# Patient Record
Sex: FEMALE | Race: BLACK OR AFRICAN AMERICAN | Employment: FULL TIME | URBAN - METROPOLITAN AREA
[De-identification: names, ages, dates, MRNs, and addresses within clinical notes are randomized per-mention and may not be internally consistent; named-entity substitution may affect disease eponyms.]

---

## 2021-05-18 ENCOUNTER — HOSPITAL ENCOUNTER (INPATIENT)
Age: 61
LOS: 4 days | Discharge: NURSING, REHAB OR LONG-TERM CARE | DRG: 502 | End: 2021-05-24
Attending: EMERGENCY MEDICINE | Admitting: INTERNAL MEDICINE
Payer: COMMERCIAL

## 2021-05-18 ENCOUNTER — APPOINTMENT (OUTPATIENT)
Dept: CT IMAGING | Age: 61
DRG: 502 | End: 2021-05-18
Payer: COMMERCIAL

## 2021-05-18 DIAGNOSIS — Z98.890 S/P ARTHROCENTESIS: ICD-10-CM

## 2021-05-18 DIAGNOSIS — N28.1 RENAL CYST: ICD-10-CM

## 2021-05-18 DIAGNOSIS — R52 UNCONTROLLED PAIN: ICD-10-CM

## 2021-05-18 DIAGNOSIS — M54.9 INTRACTABLE BACK PAIN: Primary | ICD-10-CM

## 2021-05-18 LAB
ALBUMIN SERPL-MCNC: 4 G/DL (ref 3.5–5.2)
ALP BLD-CCNC: 55 U/L (ref 35–104)
ALT SERPL-CCNC: 39 U/L (ref 0–32)
AMORPHOUS: ABNORMAL
ANION GAP SERPL CALCULATED.3IONS-SCNC: 11 MMOL/L (ref 7–16)
AST SERPL-CCNC: 53 U/L (ref 0–31)
BACTERIA: ABNORMAL /HPF
BASOPHILS ABSOLUTE: 0.01 E9/L (ref 0–0.2)
BASOPHILS RELATIVE PERCENT: 0.2 % (ref 0–2)
BILIRUB SERPL-MCNC: 1.3 MG/DL (ref 0–1.2)
BILIRUBIN URINE: NEGATIVE
BLOOD, URINE: NEGATIVE
BUN BLDV-MCNC: 12 MG/DL (ref 6–23)
CALCIUM SERPL-MCNC: 9.5 MG/DL (ref 8.6–10.2)
CHLORIDE BLD-SCNC: 102 MMOL/L (ref 98–107)
CLARITY: ABNORMAL
CO2: 22 MMOL/L (ref 22–29)
COLOR: ABNORMAL
CREAT SERPL-MCNC: 0.7 MG/DL (ref 0.5–1)
EOSINOPHILS ABSOLUTE: 0 E9/L (ref 0.05–0.5)
EOSINOPHILS RELATIVE PERCENT: 0 % (ref 0–6)
GFR AFRICAN AMERICAN: >60
GFR NON-AFRICAN AMERICAN: >60 ML/MIN/1.73
GLUCOSE BLD-MCNC: 141 MG/DL (ref 74–99)
GLUCOSE URINE: NEGATIVE MG/DL
HCT VFR BLD CALC: 35.2 % (ref 34–48)
HEMOGLOBIN: 11.6 G/DL (ref 11.5–15.5)
IMMATURE GRANULOCYTES #: 0.03 E9/L
IMMATURE GRANULOCYTES %: 0.5 % (ref 0–5)
KETONES, URINE: NEGATIVE MG/DL
LACTIC ACID: 1.2 MMOL/L (ref 0.5–2.2)
LEUKOCYTE ESTERASE, URINE: NEGATIVE
LIPASE: 13 U/L (ref 13–60)
LYMPHOCYTES ABSOLUTE: 0.2 E9/L (ref 1.5–4)
LYMPHOCYTES RELATIVE PERCENT: 3.5 % (ref 20–42)
MCH RBC QN AUTO: 33.3 PG (ref 26–35)
MCHC RBC AUTO-ENTMCNC: 33 % (ref 32–34.5)
MCV RBC AUTO: 101.1 FL (ref 80–99.9)
MONOCYTES ABSOLUTE: 0.15 E9/L (ref 0.1–0.95)
MONOCYTES RELATIVE PERCENT: 2.6 % (ref 2–12)
NEUTROPHILS ABSOLUTE: 5.35 E9/L (ref 1.8–7.3)
NEUTROPHILS RELATIVE PERCENT: 93.2 % (ref 43–80)
NITRITE, URINE: NEGATIVE
OVALOCYTES: ABNORMAL
PDW BLD-RTO: 14.8 FL (ref 11.5–15)
PH UA: 5.5 (ref 5–9)
PLATELET # BLD: 184 E9/L (ref 130–450)
PMV BLD AUTO: 10.8 FL (ref 7–12)
POIKILOCYTES: ABNORMAL
POLYCHROMASIA: ABNORMAL
POTASSIUM SERPL-SCNC: 3.4 MMOL/L (ref 3.5–5)
PROTEIN UA: 30 MG/DL
RBC # BLD: 3.48 E12/L (ref 3.5–5.5)
RBC UA: ABNORMAL /HPF (ref 0–2)
SODIUM BLD-SCNC: 135 MMOL/L (ref 132–146)
SPECIFIC GRAVITY UA: 1.02 (ref 1–1.03)
TOTAL PROTEIN: 7.7 G/DL (ref 6.4–8.3)
TROPONIN: <0.01 NG/ML (ref 0–0.03)
UROBILINOGEN, URINE: 0.2 E.U./DL
WBC # BLD: 5.7 E9/L (ref 4.5–11.5)
WBC UA: ABNORMAL /HPF (ref 0–5)

## 2021-05-18 PROCEDURE — 83605 ASSAY OF LACTIC ACID: CPT

## 2021-05-18 PROCEDURE — 96374 THER/PROPH/DIAG INJ IV PUSH: CPT

## 2021-05-18 PROCEDURE — 99220 PR INITIAL OBSERVATION CARE/DAY 70 MINUTES: CPT | Performed by: INTERNAL MEDICINE

## 2021-05-18 PROCEDURE — 84165 PROTEIN E-PHORESIS SERUM: CPT

## 2021-05-18 PROCEDURE — 83690 ASSAY OF LIPASE: CPT

## 2021-05-18 PROCEDURE — 2580000003 HC RX 258: Performed by: INTERNAL MEDICINE

## 2021-05-18 PROCEDURE — 74176 CT ABD & PELVIS W/O CONTRAST: CPT

## 2021-05-18 PROCEDURE — 96376 TX/PRO/DX INJ SAME DRUG ADON: CPT

## 2021-05-18 PROCEDURE — 86334 IMMUNOFIX E-PHORESIS SERUM: CPT

## 2021-05-18 PROCEDURE — 87040 BLOOD CULTURE FOR BACTERIA: CPT

## 2021-05-18 PROCEDURE — 81001 URINALYSIS AUTO W/SCOPE: CPT

## 2021-05-18 PROCEDURE — 6360000002 HC RX W HCPCS: Performed by: NURSE PRACTITIONER

## 2021-05-18 PROCEDURE — 0202U NFCT DS 22 TRGT SARS-COV-2: CPT

## 2021-05-18 PROCEDURE — 82784 ASSAY IGA/IGD/IGG/IGM EACH: CPT

## 2021-05-18 PROCEDURE — G0378 HOSPITAL OBSERVATION PER HR: HCPCS

## 2021-05-18 PROCEDURE — 96375 TX/PRO/DX INJ NEW DRUG ADDON: CPT

## 2021-05-18 PROCEDURE — 85025 COMPLETE CBC W/AUTO DIFF WBC: CPT

## 2021-05-18 PROCEDURE — 84145 PROCALCITONIN (PCT): CPT

## 2021-05-18 PROCEDURE — 2580000003 HC RX 258: Performed by: NURSE PRACTITIONER

## 2021-05-18 PROCEDURE — 84484 ASSAY OF TROPONIN QUANT: CPT

## 2021-05-18 PROCEDURE — 96361 HYDRATE IV INFUSION ADD-ON: CPT

## 2021-05-18 PROCEDURE — 6370000000 HC RX 637 (ALT 250 FOR IP): Performed by: NURSE PRACTITIONER

## 2021-05-18 PROCEDURE — 87150 DNA/RNA AMPLIFIED PROBE: CPT

## 2021-05-18 PROCEDURE — 87077 CULTURE AEROBIC IDENTIFY: CPT

## 2021-05-18 PROCEDURE — 87449 NOS EACH ORGANISM AG IA: CPT

## 2021-05-18 PROCEDURE — 99285 EMERGENCY DEPT VISIT HI MDM: CPT

## 2021-05-18 PROCEDURE — 36415 COLL VENOUS BLD VENIPUNCTURE: CPT

## 2021-05-18 PROCEDURE — 6360000002 HC RX W HCPCS: Performed by: INTERNAL MEDICINE

## 2021-05-18 PROCEDURE — 87186 SC STD MICRODIL/AGAR DIL: CPT

## 2021-05-18 PROCEDURE — 80053 COMPREHEN METABOLIC PANEL: CPT

## 2021-05-18 PROCEDURE — 87088 URINE BACTERIA CULTURE: CPT

## 2021-05-18 RX ORDER — ALPRAZOLAM 0.25 MG/1
0.25 TABLET ORAL 3 TIMES DAILY PRN
COMMUNITY

## 2021-05-18 RX ORDER — PANTOPRAZOLE SODIUM 40 MG/1
40 TABLET, DELAYED RELEASE ORAL
Status: DISCONTINUED | OUTPATIENT
Start: 2021-05-19 | End: 2021-05-24 | Stop reason: HOSPADM

## 2021-05-18 RX ORDER — ACETAMINOPHEN 650 MG/1
650 SUPPOSITORY RECTAL EVERY 6 HOURS PRN
Status: DISCONTINUED | OUTPATIENT
Start: 2021-05-18 | End: 2021-05-24 | Stop reason: HOSPADM

## 2021-05-18 RX ORDER — ACETAMINOPHEN 500 MG
1000 TABLET ORAL ONCE
Status: COMPLETED | OUTPATIENT
Start: 2021-05-18 | End: 2021-05-18

## 2021-05-18 RX ORDER — OMEPRAZOLE 10 MG/1
20 CAPSULE, DELAYED RELEASE ORAL EVERY OTHER DAY
COMMUNITY

## 2021-05-18 RX ORDER — 0.9 % SODIUM CHLORIDE 0.9 %
1000 INTRAVENOUS SOLUTION INTRAVENOUS ONCE
Status: COMPLETED | OUTPATIENT
Start: 2021-05-18 | End: 2021-05-18

## 2021-05-18 RX ORDER — POLYETHYLENE GLYCOL 3350 17 G/17G
17 POWDER, FOR SOLUTION ORAL DAILY PRN
Status: DISCONTINUED | OUTPATIENT
Start: 2021-05-18 | End: 2021-05-24 | Stop reason: HOSPADM

## 2021-05-18 RX ORDER — MORPHINE SULFATE 4 MG/ML
4 INJECTION, SOLUTION INTRAMUSCULAR; INTRAVENOUS ONCE
Status: COMPLETED | OUTPATIENT
Start: 2021-05-18 | End: 2021-05-18

## 2021-05-18 RX ORDER — LIDOCAINE 4 G/G
1 PATCH TOPICAL DAILY
Status: DISCONTINUED | OUTPATIENT
Start: 2021-05-19 | End: 2021-05-24 | Stop reason: HOSPADM

## 2021-05-18 RX ORDER — FLUTICASONE PROPIONATE 50 MCG
1 SPRAY, SUSPENSION (ML) NASAL DAILY PRN
COMMUNITY

## 2021-05-18 RX ORDER — ONDANSETRON 2 MG/ML
4 INJECTION INTRAMUSCULAR; INTRAVENOUS ONCE
Status: COMPLETED | OUTPATIENT
Start: 2021-05-18 | End: 2021-05-18

## 2021-05-18 RX ORDER — IBUPROFEN 400 MG/1
400 TABLET ORAL EVERY 6 HOURS PRN
Status: DISCONTINUED | OUTPATIENT
Start: 2021-05-18 | End: 2021-05-24 | Stop reason: HOSPADM

## 2021-05-18 RX ORDER — ACETAMINOPHEN 325 MG/1
650 TABLET ORAL EVERY 6 HOURS PRN
Status: DISCONTINUED | OUTPATIENT
Start: 2021-05-18 | End: 2021-05-24 | Stop reason: HOSPADM

## 2021-05-18 RX ORDER — SODIUM CHLORIDE 9 MG/ML
25 INJECTION, SOLUTION INTRAVENOUS PRN
Status: DISCONTINUED | OUTPATIENT
Start: 2021-05-18 | End: 2021-05-21 | Stop reason: SDUPTHER

## 2021-05-18 RX ORDER — ALBUTEROL SULFATE 90 UG/1
2 AEROSOL, METERED RESPIRATORY (INHALATION) EVERY 6 HOURS PRN
COMMUNITY

## 2021-05-18 RX ORDER — SODIUM CHLORIDE 0.9 % (FLUSH) 0.9 %
10 SYRINGE (ML) INJECTION EVERY 12 HOURS SCHEDULED
Status: DISCONTINUED | OUTPATIENT
Start: 2021-05-18 | End: 2021-05-21 | Stop reason: SDUPTHER

## 2021-05-18 RX ORDER — OXYCODONE AND ACETAMINOPHEN 10; 325 MG/1; MG/1
1 TABLET ORAL EVERY 4 HOURS PRN
Status: DISCONTINUED | OUTPATIENT
Start: 2021-05-18 | End: 2021-05-24 | Stop reason: HOSPADM

## 2021-05-18 RX ORDER — MORPHINE SULFATE 2 MG/ML
2 INJECTION, SOLUTION INTRAMUSCULAR; INTRAVENOUS EVERY 4 HOURS PRN
Status: DISCONTINUED | OUTPATIENT
Start: 2021-05-18 | End: 2021-05-21 | Stop reason: DRUGHIGH

## 2021-05-18 RX ORDER — FLUTICASONE FUROATE AND VILANTEROL TRIFENATATE 100; 25 UG/1; UG/1
1 POWDER RESPIRATORY (INHALATION) DAILY
COMMUNITY

## 2021-05-18 RX ORDER — OXYCODONE HYDROCHLORIDE AND ACETAMINOPHEN 5; 325 MG/1; MG/1
1 TABLET ORAL EVERY 4 HOURS PRN
Status: DISCONTINUED | OUTPATIENT
Start: 2021-05-18 | End: 2021-05-24 | Stop reason: HOSPADM

## 2021-05-18 RX ORDER — ALBUTEROL SULFATE 2.5 MG/3ML
2.5 SOLUTION RESPIRATORY (INHALATION) EVERY 6 HOURS PRN
Status: DISCONTINUED | OUTPATIENT
Start: 2021-05-18 | End: 2021-05-24 | Stop reason: HOSPADM

## 2021-05-18 RX ORDER — LORATADINE 10 MG/1
20 CAPSULE, LIQUID FILLED ORAL DAILY
COMMUNITY

## 2021-05-18 RX ORDER — BUDESONIDE, GLYCOPYRROLATE, AND FORMOTEROL FUMARATE 160; 9; 4.8 UG/1; UG/1; UG/1
AEROSOL, METERED RESPIRATORY (INHALATION)
COMMUNITY
End: 2021-05-18

## 2021-05-18 RX ORDER — OXYCODONE HYDROCHLORIDE AND ACETAMINOPHEN 5; 325 MG/1; MG/1
1 TABLET ORAL ONCE
Status: COMPLETED | OUTPATIENT
Start: 2021-05-18 | End: 2021-05-18

## 2021-05-18 RX ORDER — SODIUM CHLORIDE 0.9 % (FLUSH) 0.9 %
10 SYRINGE (ML) INJECTION PRN
Status: DISCONTINUED | OUTPATIENT
Start: 2021-05-18 | End: 2021-05-21 | Stop reason: SDUPTHER

## 2021-05-18 RX ADMIN — MORPHINE SULFATE 4 MG: 4 INJECTION, SOLUTION INTRAMUSCULAR; INTRAVENOUS at 17:02

## 2021-05-18 RX ADMIN — OXYCODONE HYDROCHLORIDE AND ACETAMINOPHEN 1 TABLET: 5; 325 TABLET ORAL at 19:57

## 2021-05-18 RX ADMIN — ACETAMINOPHEN 1000 MG: 500 TABLET ORAL at 17:03

## 2021-05-18 RX ADMIN — Medication 10 ML: at 22:03

## 2021-05-18 RX ADMIN — ONDANSETRON 4 MG: 2 INJECTION INTRAMUSCULAR; INTRAVENOUS at 17:01

## 2021-05-18 RX ADMIN — SODIUM CHLORIDE 1000 ML: 9 INJECTION, SOLUTION INTRAVENOUS at 17:01

## 2021-05-18 RX ADMIN — MORPHINE SULFATE 2 MG: 2 INJECTION, SOLUTION INTRAMUSCULAR; INTRAVENOUS at 22:13

## 2021-05-18 ASSESSMENT — PAIN SCALES - GENERAL
PAINLEVEL_OUTOF10: 9
PAINLEVEL_OUTOF10: 10
PAINLEVEL_OUTOF10: 9
PAINLEVEL_OUTOF10: 8
PAINLEVEL_OUTOF10: 9

## 2021-05-18 ASSESSMENT — PAIN DESCRIPTION - PROGRESSION: CLINICAL_PROGRESSION: GRADUALLY WORSENING

## 2021-05-18 ASSESSMENT — PAIN DESCRIPTION - FREQUENCY: FREQUENCY: CONTINUOUS

## 2021-05-18 ASSESSMENT — PAIN DESCRIPTION - ONSET: ONSET: ON-GOING

## 2021-05-18 ASSESSMENT — PAIN DESCRIPTION - LOCATION: LOCATION: BACK

## 2021-05-18 ASSESSMENT — PAIN DESCRIPTION - ORIENTATION: ORIENTATION: RIGHT;LOWER

## 2021-05-18 ASSESSMENT — PAIN DESCRIPTION - PAIN TYPE: TYPE: ACUTE PAIN

## 2021-05-18 ASSESSMENT — PAIN DESCRIPTION - DESCRIPTORS: DESCRIPTORS: ACHING;CONSTANT;SHARP

## 2021-05-18 NOTE — ED NOTES
Bed: 09  Expected date:   Expected time:   Means of arrival:   Comments:  JIHAN Urban RN  05/18/21 6520

## 2021-05-18 NOTE — Clinical Note
Patient Class: Observation [104]   REQUIRED: Diagnosis: Uncontrolled pain [047767]   Estimated Length of Stay: Estimated stay of less than 2 midnights   Admitting Provider: Myron James [[id number]]   Telemetry/Cardiac Monitoring Required?: No

## 2021-05-18 NOTE — ED PROVIDER NOTES
ED Attending  [facility]   ED  Encounter Note  Admit Date/RoomTime: 2021  2:59 PM  ED Room:     NAME: Chhaya Hyde  : 1960  MRN: [id number]     Chief Complaint:  Flank Pain (Right sided- started last night- hx of kidney stone)    History of Present Illness        Chhaya Hyde is a 63 y.o. old female who presents to the emergency department by private vehicle for sudden onset aching, sharp, shooting, stabbing pain in the right flank without radiation which began a few day(s) prior to arrival.   There has been similar episodes in the past , hx of kidney stones. Since onset the symptoms have been remaining constant. The pain is associated with nothing pertinent. The pain is aggravated by nothing in particular and relieved by nothing. There has been NO abdominal pain, cloudy urine, constipation, diarrhea, dysuria, headache, hematuria, sweating, urinary frequency, urinary incontinence, urinary urgency, vaginal discharge, vaginal itching or vomiting. She denies any shortness breath, chest pain, lightness, dizziness, saddle anesthesia, nausea bowel bladder incontinence, numbness, tingling, weakness. .  ROS   Pertinent positives and negatives are stated within HPI, all other systems reviewed and are negative. Past Medical History:  has a past medical history of Asthma, Cancer ([address] and Kidney stones. Surgical History has a past surgical history that includes Breast surgery. Social History:  reports that she quit smoking about 44 years ago. Her smoking use included cigarettes. She started smoking about 46 years ago. She smoked 0.25 packs per day. She has never used smokeless tobacco. She reports previous alcohol use. She reports that she does not use drugs. Family History: family history is not on file.      Allergies: Adhesive tape, Clarithromycin, and Codeine    Physical Exam   Oxygen Saturation Interpretation: Normal.        ED Triage Vitals [05/18/21 1503]   BP Temp Temp Source Pulse Resp SpO2 Height Weight   115/70 100.4 °F (38 °C) Oral 83 16 95 % 5' 6\" (1.676 m) 160 lb (72.6 kg)         General Appearance/Constitutional:  Alert, development consistent with age  [de-identified]:  NC/NT. PERRLA. Airway patent. Neck:  Supple. No lymphadenopathy. Respiratory:  No retractions. Lungs Clear to auscultation and breath sounds equal.  CV:  Regular rate and rhythm. GI:  normal appearing, non-distended with no visible hernias. Bowel sounds: normal bowel sounds. Tenderness: There is mild tenderness present - located in the right flank., There is no rebound tenderness. , There is no guarding. , There is no distension. , There is no pulsatile mass. .           Liver: non-tender. Spleen:  non-tender. Back: CVA Tenderness: Yes, Right. Integument:  Normal turgor. Warm, dry, without visible rash, unless noted elsewhere. Lymphatics: No edema, cap.refill <3sec. Neurological:  Orientation age-appropriate. Motor functions intact.     Lab / Imaging Results   (All laboratory and radiology results have been personally reviewed by myself)  Labs:  Results for orders placed or performed during the hospital encounter of 05/18/21   CBC Auto Differential   Result Value Ref Range    WBC 5.7 4.5 - 11.5 E9/L    RBC 3.48 (L) 3.50 - 5.50 E12/L    Hemoglobin 11.6 11.5 - 15.5 g/dL    Hematocrit 35.2 34.0 - 48.0 %    .1 (H) 80.0 - 99.9 fL    MCH 33.3 26.0 - 35.0 pg    MCHC 33.0 32.0 - 34.5 %    RDW 14.8 11.5 - 15.0 fL    Platelets 891 045 - 083 E9/L    MPV 10.8 7.0 - 12.0 fL    Neutrophils % 93.2 (H) 43.0 - 80.0 %    Immature Granulocytes % 0.5 0.0 - 5.0 %    Lymphocytes % 3.5 (L) 20.0 - 42.0 %    Monocytes % 2.6 2.0 - 12.0 %    Eosinophils % 0.0 0.0 - 6.0 %    Basophils % 0.2 0.0 - 2.0 %    Neutrophils Absolute 5.35 1.80 - 7.30 E9/L    Immature Granulocytes # 0.03 E9/L    Lymphocytes Absolute 0.20 (L) 1.50 - 4.00 E9/L    Monocytes Absolute 0.15 0.10 - 0.95 E9/L    Eosinophils Absolute 0.00 (L) 0.05 - 0.50 E9/L    Basophils Absolute 0.01 0.00 - 0.20 E9/L    Polychromasia 1+     Poikilocytes 1+     Ovalocytes 1+    Comprehensive Metabolic Panel   Result Value Ref Range    Sodium 135 132 - 146 mmol/L    Potassium 3.4 (L) 3.5 - 5.0 mmol/L    Chloride 102 98 - 107 mmol/L    CO2 22 22 - 29 mmol/L    Anion Gap 11 7 - 16 mmol/L    Glucose 141 (H) 74 - 99 mg/dL    BUN 12 6 - 23 mg/dL    CREATININE 0.7 0.5 - 1.0 mg/dL    GFR Non-African American >60 >=60 mL/min/1.73    GFR African American >60     Calcium 9.5 8.6 - 10.2 mg/dL    Total Protein 7.7 6.4 - 8.3 g/dL    Albumin 4.0 3.5 - 5.2 g/dL    Total Bilirubin 1.3 (H) 0.0 - 1.2 mg/dL    Alkaline Phosphatase 55 35 - 104 U/L    ALT 39 (H) 0 - 32 U/L    AST 53 (H) 0 - 31 U/L   Lipase   Result Value Ref Range    Lipase 13 13 - 60 U/L   Troponin   Result Value Ref Range    Troponin <0.01 0.00 - 0.03 ng/mL   Urinalysis   Result Value Ref Range    Color, UA DARK YELLOW (A) Straw/Yellow    Clarity, UA SL CLOUDY Clear    Glucose, Ur Negative Negative mg/dL    Bilirubin Urine Negative Negative    Ketones, Urine Negative Negative mg/dL    Specific Gravity, UA 1.025 1.005 - 1.030    Blood, Urine Negative Negative    pH, UA 5.5 5.0 - 9.0    Protein, UA 30 (A) Negative mg/dL    Urobilinogen, Urine 0.2 <2.0 E.U./dL    Nitrite, Urine Negative Negative    Leukocyte Esterase, Urine Negative Negative   Lactic Acid, Plasma   Result Value Ref Range    Lactic Acid 1.2 0.5 - 2.2 mmol/L   Microscopic Urinalysis   Result Value Ref Range    WBC, UA 0-1 0 - 5 /HPF    RBC, UA 0-1 0 - 2 /HPF    Bacteria, UA MODERATE (A) None Seen /HPF    Amorphous, UA FEW      Imaging: All Radiology results interpreted by Radiologist unless otherwise noted. CT ABDOMEN PELVIS WO CONTRAST Additional Contrast? None   Final Result   1. No acute abnormality is seen in the abdomen or the pelvis.    2. No urolithiasis or hydronephrosis   3. 1.5 probable complex cyst in the right kidney. Sonographic evaluation   recommended. 4. Subtle lucencies in the pelvic bones are nonspecific and of questionable   significance. Possibility metastatic disease or multiple myeloma should be   considered. Clinical correlation is needed. ED Course / Medical Decision Making     Medications   0.9 % sodium chloride bolus (0 mLs Intravenous Stopped 5/18/21 1824)   ondansetron (ZOFRAN) injection 4 mg (4 mg Intravenous Given 5/18/21 1701)   morphine sulfate (PF) injection 4 mg (4 mg Intravenous Given 5/18/21 1702)   acetaminophen (TYLENOL) tablet 1,000 mg (1,000 mg Oral Given 5/18/21 1703)   oxyCODONE-acetaminophen (PERCOCET) 5-325 MG per tablet 1 tablet (1 tablet Oral Given 5/18/21 1957)        Re-Evaluations:  5/18/21      Time: 1805-reviewed all results with patient. Patient states she is unable to ambulate due to pain. Patient states all her doctors are in [place]. She states she has been in remission from breast cancer for the last 6 months. Currently not on any treatment at this time. 1923-patient states she is unable to ambulate due to pain. She states she has no local follow-up and requesting to be admitted. 2059- RN attempted to ambulate patient. Patient states she is unable to stand due to pain. Patients condition remains unchanged. Consultations:  [address] [place] [address] with Dr. Sheela Dnaielle, hospitalist, he requests to give patient a Percocet and wait to see if patient can ambulate, if she is unable to he will admit for pain control. 2059- Spoke with Dr. Sheela Danielle, he agrees and accepts admission to the hospital for uncontrolled pain. Procedures:   none    MDM:  Patient presents to the ED for right lower back/flank pain. Differential diagnoses included but not limited to kidney stone versus UTI versus muscular strain. Workup in the ED revealed urinalysis was unremarkable.   Blood cultures were pending at this time due to patient having a 100.4 fever. Lactic acid was 1.2. CBC revealed a normal white count of 5.7. CMP revealed slightly elevated liver enzymes. Lipase was normal at 13. Troponin was less than 0.01. Patient denies any short of breath or chest pain. CT of the abdomen pelvis reveals no acute abnormality seen in the abdomen pelvis. No urolithiasis or hydronephrosis. Probable complex cyst in the right kidney. Subtle lucencies in the pelvic bone. Patient has a history of breast cancer which she states [place] the [place] the [place] 6 [place] and follows with an oncologist and went to [facility] where she resides. She states she is unable to return home due to pain and unable to walk. She states she is been laying on her family members couch and unable to get up. Patient was given IV fluids, morphine and Percocet with minimal pain relief. She states pain has reduced when laying still. Patient refuses and states she is unable to ambulate due to pain. Patient requires continued workup and management of their symptoms and will be admitted to the hospital for further evaluation and treatment. Plan of Care/Counseling:  I reviewed today's visit with the patient in addition to providing specific details for the plan of care and counseling regarding the diagnosis and prognosis. Questions are answered at this time and are agreeable with the plan. Assessment      1. Intractable back pain    2. Renal cyst      This patient's ED course included: a personal history and physicial examination, re-evaluation prior to disposition, multiple bedside re-evaluations and IV medications  This patient has remained hemodynamically stable during their ED course. Plan   Admission to General Medical Floor  Patient condition is stable.     New Medications     New Prescriptions    No medications on file     Electronically signed by JESSA Arizmendi CNP   DD: 5/18/21  **This report was transcribed using voice recognition software. Every effort was made to ensure accuracy; however, inadvertent computerized transcription errors may be present.   END OF PROVIDER NOTE        Martin Urban, JESSA - CNP  05/18/21 8719

## 2021-05-19 ENCOUNTER — APPOINTMENT (OUTPATIENT)
Dept: CT IMAGING | Age: 61
DRG: 502 | End: 2021-05-19
Payer: COMMERCIAL

## 2021-05-19 LAB
ACINETOBACTER BAUMANNII BY PCR: NOT DETECTED
ADENOVIRUS BY PCR: NOT DETECTED
BORDETELLA PARAPERTUSSIS BY PCR: NOT DETECTED
BORDETELLA PERTUSSIS BY PCR: NOT DETECTED
BOTTLE TYPE: ABNORMAL
C-REACTIVE PROTEIN: 27.5 MG/DL (ref 0–0.4)
CANDIDA ALBICANS BY PCR: NOT DETECTED
CANDIDA GLABRATA BY PCR: NOT DETECTED
CANDIDA KRUSEI BY PCR: NOT DETECTED
CANDIDA PARAPSILOSIS BY PCR: NOT DETECTED
CANDIDA TROPICALIS BY PCR: NOT DETECTED
CHLAMYDOPHILIA PNEUMONIAE BY PCR: NOT DETECTED
CORONAVIRUS 229E BY PCR: NOT DETECTED
CORONAVIRUS HKU1 BY PCR: NOT DETECTED
CORONAVIRUS NL63 BY PCR: NOT DETECTED
CORONAVIRUS OC43 BY PCR: NOT DETECTED
ENTEROBACTER CLOACAE COMPLEX BY PCR: NOT DETECTED
ENTEROBACTERALES BY PCR: NOT DETECTED
ENTEROCOCCUS BY PCR: NOT DETECTED
ESCHERICHIA COLI BY PCR: NOT DETECTED
HAEMOPHILUS INFLUENZAE BY PCR: NOT DETECTED
HUMAN METAPNEUMOVIRUS BY PCR: NOT DETECTED
HUMAN RHINOVIRUS/ENTEROVIRUS BY PCR: DETECTED
INFLUENZA A BY PCR: NOT DETECTED
INFLUENZA B BY PCR: NOT DETECTED
KLEBSIELLA OXYTOCA BY PCR: NOT DETECTED
KLEBSIELLA PNEUMONIAE GROUP BY PCR: NOT DETECTED
LISTERIA MONOCYTOGENES BY PCR: NOT DETECTED
MYCOPLASMA PNEUMONIAE BY PCR: NOT DETECTED
NEISSERIA MENINGITIDIS BY PCR: NOT DETECTED
ORDER NUMBER: ABNORMAL
PARAINFLUENZA VIRUS 1 BY PCR: NOT DETECTED
PARAINFLUENZA VIRUS 2 BY PCR: NOT DETECTED
PARAINFLUENZA VIRUS 3 BY PCR: NOT DETECTED
PARAINFLUENZA VIRUS 4 BY PCR: NOT DETECTED
PROCALCITONIN: 2.66 NG/ML (ref 0–0.08)
PROTEUS SPECIES BY PCR: NOT DETECTED
PSEUDOMONAS AERUGINOSA BY PCR: NOT DETECTED
RESPIRATORY SYNCYTIAL VIRUS BY PCR: NOT DETECTED
SARS-COV-2, PCR: NOT DETECTED
SEDIMENTATION RATE, ERYTHROCYTE: 70 MM/HR (ref 0–20)
SERRATIA MARCESCENS BY PCR: NOT DETECTED
SOURCE OF BLOOD CULTURE: ABNORMAL
STAPHYLOCOCCUS AUREUS BY PCR: NOT DETECTED
STAPHYLOCOCCUS SPECIES BY PCR: NOT DETECTED
STREPTOCOCCUS AGALACTIAE BY PCR: NOT DETECTED
STREPTOCOCCUS PNEUMONIAE BY PCR: DETECTED
STREPTOCOCCUS PYOGENES  BY PCR: NOT DETECTED
STREPTOCOCCUS SPECIES BY PCR: DETECTED

## 2021-05-19 PROCEDURE — 6370000000 HC RX 637 (ALT 250 FOR IP): Performed by: INTERNAL MEDICINE

## 2021-05-19 PROCEDURE — 86140 C-REACTIVE PROTEIN: CPT

## 2021-05-19 PROCEDURE — 36415 COLL VENOUS BLD VENIPUNCTURE: CPT

## 2021-05-19 PROCEDURE — G0378 HOSPITAL OBSERVATION PER HR: HCPCS

## 2021-05-19 PROCEDURE — 6360000004 HC RX CONTRAST MEDICATION: Performed by: RADIOLOGY

## 2021-05-19 PROCEDURE — 85651 RBC SED RATE NONAUTOMATED: CPT

## 2021-05-19 PROCEDURE — 96372 THER/PROPH/DIAG INJ SC/IM: CPT

## 2021-05-19 PROCEDURE — 87040 BLOOD CULTURE FOR BACTERIA: CPT

## 2021-05-19 PROCEDURE — 2580000003 HC RX 258: Performed by: SPECIALIST

## 2021-05-19 PROCEDURE — 93308 TTE F-UP OR LMTD: CPT

## 2021-05-19 PROCEDURE — 2580000003 HC RX 258: Performed by: INTERNAL MEDICINE

## 2021-05-19 PROCEDURE — 51702 INSERT TEMP BLADDER CATH: CPT

## 2021-05-19 PROCEDURE — 6360000002 HC RX W HCPCS: Performed by: SPECIALIST

## 2021-05-19 PROCEDURE — 99225 PR SBSQ OBSERVATION CARE/DAY 25 MINUTES: CPT | Performed by: INTERNAL MEDICINE

## 2021-05-19 PROCEDURE — 96375 TX/PRO/DX INJ NEW DRUG ADDON: CPT

## 2021-05-19 PROCEDURE — 6360000002 HC RX W HCPCS: Performed by: INTERNAL MEDICINE

## 2021-05-19 PROCEDURE — 73202 CT UPPR EXTREMITY W/O&W/DYE: CPT

## 2021-05-19 RX ADMIN — WATER 2000 MG: 1 INJECTION INTRAMUSCULAR; INTRAVENOUS; SUBCUTANEOUS at 17:25

## 2021-05-19 RX ADMIN — IOPAMIDOL 75 ML: 755 INJECTION, SOLUTION INTRAVENOUS at 16:34

## 2021-05-19 RX ADMIN — Medication 10 ML: at 09:51

## 2021-05-19 RX ADMIN — OXYCODONE HYDROCHLORIDE AND ACETAMINOPHEN 1 TABLET: 5; 325 TABLET ORAL at 09:45

## 2021-05-19 RX ADMIN — OXYCODONE AND ACETAMINOPHEN 1 TABLET: 10; 325 TABLET ORAL at 05:08

## 2021-05-19 RX ADMIN — PANTOPRAZOLE SODIUM 40 MG: 40 TABLET, DELAYED RELEASE ORAL at 05:08

## 2021-05-19 RX ADMIN — OXYCODONE AND ACETAMINOPHEN 1 TABLET: 10; 325 TABLET ORAL at 00:18

## 2021-05-19 RX ADMIN — Medication 10 ML: at 20:30

## 2021-05-19 RX ADMIN — OXYCODONE HYDROCHLORIDE AND ACETAMINOPHEN 1 TABLET: 5; 325 TABLET ORAL at 14:45

## 2021-05-19 RX ADMIN — ENOXAPARIN SODIUM 40 MG: 40 INJECTION SUBCUTANEOUS at 09:45

## 2021-05-19 RX ADMIN — OXYCODONE AND ACETAMINOPHEN 1 TABLET: 10; 325 TABLET ORAL at 19:18

## 2021-05-19 ASSESSMENT — PAIN DESCRIPTION - ONSET
ONSET: ON-GOING

## 2021-05-19 ASSESSMENT — PAIN DESCRIPTION - DESCRIPTORS
DESCRIPTORS: ACHING
DESCRIPTORS: ACHING;SHARP
DESCRIPTORS: SHARP;ACHING
DESCRIPTORS: ACHING;SHARP;DISCOMFORT

## 2021-05-19 ASSESSMENT — PAIN DESCRIPTION - PROGRESSION
CLINICAL_PROGRESSION: GRADUALLY WORSENING
CLINICAL_PROGRESSION: NOT CHANGED

## 2021-05-19 ASSESSMENT — PAIN DESCRIPTION - ORIENTATION
ORIENTATION: RIGHT;LOWER
ORIENTATION: LEFT
ORIENTATION: RIGHT;OTHER (COMMENT)
ORIENTATION: RIGHT;LOWER
ORIENTATION: RIGHT

## 2021-05-19 ASSESSMENT — PAIN DESCRIPTION - LOCATION
LOCATION: BACK
LOCATION: BACK;ARM
LOCATION: BACK;ARM
LOCATION: BACK;SHOULDER
LOCATION: BACK

## 2021-05-19 ASSESSMENT — PAIN SCALES - GENERAL
PAINLEVEL_OUTOF10: 7
PAINLEVEL_OUTOF10: 4
PAINLEVEL_OUTOF10: 7
PAINLEVEL_OUTOF10: 8
PAINLEVEL_OUTOF10: 7
PAINLEVEL_OUTOF10: 4
PAINLEVEL_OUTOF10: 6
PAINLEVEL_OUTOF10: 7
PAINLEVEL_OUTOF10: 8
PAINLEVEL_OUTOF10: 7

## 2021-05-19 ASSESSMENT — PAIN - FUNCTIONAL ASSESSMENT
PAIN_FUNCTIONAL_ASSESSMENT: PREVENTS OR INTERFERES SOME ACTIVE ACTIVITIES AND ADLS

## 2021-05-19 ASSESSMENT — PAIN DESCRIPTION - FREQUENCY
FREQUENCY: CONTINUOUS

## 2021-05-19 ASSESSMENT — PAIN DESCRIPTION - PAIN TYPE
TYPE: ACUTE PAIN

## 2021-05-19 NOTE — H&P
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 Was on Arimidex for 5 years. Also has MGUS with recent monoclonal protein level stable. She follows with physicians in [facility] and is planning to return there soon. Yesterday had chills. Today had pain in her back to the point of not being able to ambulate or move without significant pain therefore presented to ED for further evaluation. Given a dose of morphine and Percocet but still had significant pain and inability to ambulate therefore referred for mission. Discussed with patient, she would like evaluation to be done here. Informant(s) for H&P: Patient    REVIEW OF SYSTEMS:  A comprehensive 14 point review of systems was negative except for: what is in the HPI    PMH:  Past Medical History:   Diagnosis Date    Asthma     Cancer ([facility] [address]     Kidney stones        Surgical History:  Past Surgical History:   Procedure Laterality Date    BREAST SURGERY      L lumpectomy        Medications Prior to Admission:    Prior to Admission medications    Medication Sig Start Date End Date Taking?  Authorizing Provider   omeprazole (PRILOSEC) 10 MG delayed release capsule Take 20 mg by mouth every other day    Yes Historical Provider, MD   fluticasone-vilanterol (BREO ELLIPTA) 100-25 MCG/INH AEPB inhaler Inhale 1 puff into the lungs daily   Yes Historical Provider, MD   albuterol sulfate HFA (PROAIR HFA) 108 (90 Base) MCG/ACT inhaler Inhale 2 puffs into the lungs every 6 hours as needed for Wheezing   Yes Historical Provider, MD   fluticasone (FLONASE) 50 MCG/ACT nasal spray 1 spray by Each Nostril route daily as needed for Rhinitis   Yes Historical Provider, MD   loratadine (CLARITIN) 10 MG capsule Take 20 mg by mouth daily   Yes Historical Provider, MD   vitamin D 25 MCG (1000 UT) CAPS Take 1,000 Units by mouth daily   Yes Historical Provider, MD   ALPRAZolam (XANAX) 0.25 MG tablet Take 0.25 mg by mouth 3 times daily as needed for Sleep. Yes Historical Provider, MD       Allergies:    Adhesive tape, Clarithromycin, and Codeine    Social History:    reports that she quit smoking about 44 years ago. Her smoking use included cigarettes. She started smoking about 46 years ago. She smoked 0.25 packs per day. She has never used smokeless tobacco. She reports previous alcohol use. She reports that she does not use drugs. Family History:   No family history of breast cancer    PHYSICAL EXAM:  Vitals:  /73   Pulse 85   Temp 98.6 °F (37 °C) (Oral)   Resp 16   Ht 5' 6\" (1.676 m)   Wt 160 lb (72.6 kg)   SpO2 96%   BMI 25.82 kg/m²   General Appearance: alert and oriented to person, place and time and in no acute distress  Skin: warm and dry, turgor not diminished  Head: normocephalic and atraumatic  Eyes: pupils equal, round, and reactive to light, extraocular eye movements intact, conjunctivae normal  Neck: neck supple and non tender without mass   Pulmonary/Chest: clear to auscultation bilaterally- no wheezes, rales or rhonchi, normal air movement, no respiratory distress  Cardiovascular: normal rate, normal S1 and S2 and no M/R/R  Abdomen: soft, non-tender, non-distended, normal bowel sounds, no masses or organomegaly. Point tenderness over her right lower back  Extremities: no cyanosis, no clubbing and no edema  Neurologic: no cranial nerve deficit and speech normal      LABS:  Recent Labs     05/18/21  1543      K 3.4*      CO2 22   BUN 12   CREATININE 0.7   GLUCOSE 141*   CALCIUM 9.5       Recent Labs     05/18/21  1543   WBC 5.7   RBC 3.48*   HGB 11.6   HCT 35.2   .1*   MCH 33.3   MCHC 33.0   RDW 14.8      MPV 10.8       No results for input(s): POCGLU in the last 72 hours. Radiology:   CT ABDOMEN PELVIS WO CONTRAST Additional Contrast? None   Final Result   1.   No acute abnormality is seen in the abdomen or the pelvis. 2. No urolithiasis or hydronephrosis   3. 1.5 probable complex cyst in the right kidney. Sonographic evaluation   recommended. 4. Subtle lucencies in the pelvic bones are nonspecific and of questionable   significance. Possibility metastatic disease or multiple myeloma should be   considered. Clinical correlation is needed. ASSESSMENT:    Uncontrolled pain  Lucency in pelvic bone on CT  stage 1 left invasive ductal carcinoma ER/CA positive s/p lumpectomy and radiation. MGUS  Mild intermittent asthma    PLAN:  Acute lower back pain with tenderness overlaying lucencies noted on CT pelvis:  -?malignant from breast vs plasmacytoma.  -analgesics, PT/OT  -consult oncology for further recs re biopsy of lesion vs bone scan  -low grade fever. Blood cultures sent. Sent respiratory panel and procalcitonin. Monitor off abx for now    MGUS: spep    Asthma: not in exacerbation    DVT prophylaxis: lovenox      NOTE: This report was transcribed using voice recognition software. Every effort was made to ensure accuracy; however, inadvertent computerized transcription errors may be present.   Electronically signed by Marylen Evener, MD on 5/18/2021 at 9:25 PM

## 2021-05-19 NOTE — PLAN OF CARE
Problem: Falls - Risk of:  Goal: Will remain free from falls  Description: Will remain free from falls  Outcome: Met This Shift     Problem: Skin Integrity:  Goal: Will show no infection signs and symptoms  Description: Will show no infection signs and symptoms  5/19/2021 1931 by Javier Frances RN  Outcome: Met This Shift     Problem: Skin Integrity:  Goal: Absence of new skin breakdown  Description: Absence of new skin breakdown  5/19/2021 1931 by Javier Frances RN  Outcome: Met This Shift

## 2021-05-19 NOTE — ED NOTES
DAVIDAR faxed to [facility] Juanita Pabon [facility]ed[facility]. Mark So for confirmation of receipt.      Valerie Hayes RN  05/18/21 5325

## 2021-05-19 NOTE — CONSULTS
05/18/2021    BUN 12 05/18/2021    CREATININE 0.7 05/18/2021    GFRAA >60 05/18/2021    LABGLOM >60 05/18/2021    GLUCOSE 141 05/18/2021    PROT 7.7 05/18/2021    LABALBU 4.0 05/18/2021    CALCIUM 9.5 05/18/2021    BILITOT 1.3 05/18/2021    ALKPHOS 55 05/18/2021    AST 53 05/18/2021    ALT 39 05/18/2021       No results found for: PROTIME, INR    No results found for: TSH    Lab Results   Component Value Date    COLORU DARK YELLOW 05/18/2021    PHUR 5.5 05/18/2021    WBCUA 0-1 05/18/2021    RBCUA 0-1 05/18/2021    BACTERIA MODERATE 05/18/2021    CLARITYU SL CLOUDY 05/18/2021    SPECGRAV 1.025 05/18/2021    LEUKOCYTESUR Negative 05/18/2021    UROBILINOGEN 0.2 05/18/2021    BILIRUBINUR Negative 05/18/2021    BLOODU Negative 05/18/2021    GLUCOSEU Negative 05/18/2021    AMORPHOUS FEW 05/18/2021       No results found for: HCO3, BE, O2SAT, PH, THGB, PCO2, PO2, TCO2  Radiology:  CT of the abdomen and pelvis reviewed    Microbiology:  Pending  Recent Labs     05/18/21  1809   BC Gram stain performed from blood culture bottle media  Gram positive cocci in chains  *     No results for input(s): ORG in the last 72 hours. Recent Labs     05/18/21  1958   BLOODCULT2 Gram stain performed from blood culture bottle media  Gram positive cocci in chains  *     No results for input(s): STREPNEUMAGU in the last 72 hours. No results for input(s): LP1UAG in the last 72 hours. No results for input(s): ASO in the last 72 hours. No results for input(s): CULTRESP in the last 72 hours.   Recent Labs     05/18/21  1543   PROCAL 2.66*       Assessment:  · High-grade Streptococcus pneumoniae bacteremia  · Probable right shoulder septic arthritis  · Invasive pneumococcal disease  · Right pelvic pain  · Rhinovirus/Enterovirus infection  · Pelvic bone lucencies the patient apparently had a history of MGUS in the past.  She also has a history of breast cancer    Plan:    · Repeat blood cultures x2  · Consult orthopedic surgery for right shoulder arthrocentesis  · CT of the right shoulder with contrast  · Check cultures, baseline ESR, CRP  · Monitor labs  · Will follow with you    Thank you for having us see this patient in consultation. I will be discussing this case with the treating physicians.     Vinny Peters MD  2:03 PM  5/19/2021

## 2021-05-19 NOTE — CONSULTS
drugs. Family History:   Non-contributory to this Urological problem  family history is not on file. Review of Systems:  Constitutional: No fever or chills   Respiratory: negative for cough and hemoptysis  Cardiovascular: negative for chest pain and dyspnea  Gastrointestinal: negative for abdominal pain, diarrhea, nausea and vomiting   Derm: negative for rash and skin lesion(s)  Neurological: negative for seizures and tremors  Musculoskeletal: back pain    Psychiatric: Negative   : As above in the HPI, otherwise negative  All other reviews are negative    Physical Exam:     Vitals:  /73   Pulse 88   Temp 98.2 °F (36.8 °C) (Oral)   Resp 16   Ht 5' 6\" (1.676 m)   Wt 160 lb (72.6 kg)   SpO2 94%   BMI 25.82 kg/m²     General:  Awake, alert, oriented X 3. No apparent distress. HEENT:  Normocephalic, atraumatic. Lungs:  Respirations symmetric and non-labored. Abdomen:  soft, nontender, no masses  Extremities:  No clubbing, cyanosis, or edema  Skin:  Warm and dry, no open lesions or rashes  Neuro: There are no motor or sensory deficits in the 4 quadrant extremities   Rectal: deferred  Genitourinary: De Jesus draining yellow urine     Labs:     Recent Labs     05/18/21  1543   WBC 5.7   RBC 3.48*   HGB 11.6   HCT 35.2   .1*   MCH 33.3   MCHC 33.0   RDW 14.8      MPV 10.8         Recent Labs     05/18/21  1543   CREATININE 0.7       Imaging:   Narrative   EXAMINATION:   CT OF THE ABDOMEN AND PELVIS WITHOUT CONTRAST 5/18/2021 4:43 pm       TECHNIQUE:   CT of the abdomen and pelvis was performed without the administration of   intravenous contrast. Multiplanar reformatted images are provided for review.    Dose modulation, iterative reconstruction, and/or weight based adjustment of   the mA/kV was utilized to reduce the radiation dose to as low as reasonably   achievable.       COMPARISON:   None.       HISTORY:   ORDERING SYSTEM PROVIDED HISTORY: right flank pain, hx kidney stones obstructive uropathy   Oncology following, lucencies in pelvic bones noted on CTAP, NM bone scan pending   Cont the shi catheter  Voiding trial when patient is more ambulatory   No further acute  interventions planned at this time  Please call with questions or concerns       Electronically signed by JESSA Cason CNP on 5/19/2021 at 12:12 PM         The patient was seen and examined. I have reviewed the medical record in detail. I agree with the plan as outlined by ISAC Cason.     Florence Love MD  4:49 PM  5/20/2021

## 2021-05-19 NOTE — PROGRESS NOTES
Called Dr. Talon Otto via [employer] give consult  Waiting for call back  Consult for Septic shoulder Right   Streptococcus bacteremia  Office called back will get consult message to Dr. Talon Otto  .  2:50 PM

## 2021-05-19 NOTE — CONSULTS
[facility]  Hematology/Oncology  Consult      Patient Name: John Tobar  YOB: 1960  PCP: No primary care provider on file. Referring Provider:      Reason for Consultation:   Chief Complaint   Patient presents with    Flank Pain     Right sided- started last night- hx of kidney stone        History of Present Illness: This is a 34-year-old female patient who follows with [facility] oncology in [place] for history of left breast invasive ductal carcinoma. She was stage I ER/NV positive HER-2/yu negative by FISH. She is status post a left breast lumpectomy in 2014. She completed MammoSite radiation in 2015 and adjuvant Arimidex finished in February 2020. She was also following for monoclonal gammopathy of unknown significance at that time. She has had a stable M spike between 1.2 and 1.5 since 2015. Her most recent monoclonal protein levels were stable. She presented to the emergency room for severe back pain making it difficult to ambulate. CTAP showed a 1.5 probable complex cyst in the right kidney. Subtle lucencies in the pelvic bones are nonspecific and of questionable significance. Blood cultures were positive. Consultation for further recommendations and evaluation of lucencies on CT AP with a history of breast CA and MGUS. Diagnostic Data:     Past Medical History:   Diagnosis Date    Asthma     Cancer [facility])     Kidney stones        Patient Active Problem List    Diagnosis Date Noted    Uncontrolled pain 05/18/2021        Past Surgical History:   Procedure Laterality Date    BREAST SURGERY      L lumpectomy        Family History  History reviewed. No pertinent family history. Social History    TOBACCO:   reports that she quit smoking about 44 years ago. Her smoking use included cigarettes. She started smoking about 46 years ago. She smoked 0.25 packs per day.  She has never used smokeless tobacco.  ETOH:   reports previous alcohol use. Home Medications  Prior to Admission medications    Medication Sig Start Date End Date Taking? Authorizing Provider   omeprazole (PRILOSEC) 10 MG delayed release capsule Take 20 mg by mouth every other day    Yes Historical Provider, MD   fluticasone-vilanterol (BREO ELLIPTA) 100-25 MCG/INH AEPB inhaler Inhale 1 puff into the lungs daily   Yes Historical Provider, MD   albuterol sulfate HFA (PROAIR HFA) 108 (90 Base) MCG/ACT inhaler Inhale 2 puffs into the lungs every 6 hours as needed for Wheezing   Yes Historical Provider, MD   fluticasone (FLONASE) 50 MCG/ACT nasal spray 1 spray by Each Nostril route daily as needed for Rhinitis   Yes Historical Provider, MD   loratadine (CLARITIN) 10 MG capsule Take 20 mg by mouth daily   Yes Historical Provider, MD   vitamin D 25 MCG (1000 UT) CAPS Take 1,000 Units by mouth daily   Yes Historical Provider, MD   ALPRAZolam (XANAX) 0.25 MG tablet Take 0.25 mg by mouth 3 times daily as needed for Sleep. Yes Historical Provider, MD       Allergies  Allergies   Allergen Reactions    Adhesive Tape      Other reaction(s): Dermatitis    Clarithromycin Nausea Only     Other reaction(s): severe gas, heartburn    Codeine Nausea Only     Other reaction(s): GI Intolerance, upset stomach       Review of Systems:  Lower back pain, nausea, fatigue. Objective  /73   Pulse 88   Temp 98.2 °F (36.8 °C) (Oral)   Resp 16   Ht 5' 6\" (1.676 m)   Wt 160 lb (72.6 kg)   SpO2 94%   BMI 25.82 kg/m²     Physical Exam:   Performance Status:  General: AAO to person, place, time, in no acute distress,   Head and neck : PERRLA, EOMI . Sclera non icteric. Oropharynx : Clear  Neck: no JVD,  no adenopathy  Heart: Regular rate and regular rhythm, no murmur  Lungs: Clear to auscultation   Extremities: No edema,no cyanosis, no clubbing. Abdomen: Soft, non-tender;no masses, no organomegaly  Skin:  No rash. Neurologic:Cranial nerves grossly intact.  No focal motor or sensory heart is normal in size. No pleural or pericardial effusion. Organs: Liver: Unremarkable. Gallbladder: Unremarkable. Pancreas: Unremarkable. Spleen:  Unremarkable. Adrenals: Unremarkable. Kidneys: Normal in size and contour. 1.4 cm hypodense lesion along the midpole is fatty and consistent with an angiomyolipoma. No stone or hydronephrosis. 1.5 cm mildly hypodense lesion along the upper pole of the right kidney (series 2, image 48) is nonspecific. It may represent a complex cyst. GI/Bowel: No bowel wall thickening or obstruction. Normal appendix. Pelvis: The urinary bladder is unremarkable. Within limits of the CT technique, the uterus and the adnexa are grossly unremarkable. Peritoneum/Retroperitoneum: No lymphadenopathy. No free air or free fluid is seen. The abdominal aorta and pelvic arteries are unremarkable. Bones/Soft Tissues:  No fracture or joint dislocation is seen. Subtle lucencies in the pelvic bones are nonspecific. 1.  No acute abnormality is seen in the abdomen or the pelvis. 2. No urolithiasis or hydronephrosis 3. 1.5 probable complex cyst in the right kidney. Sonographic evaluation recommended. 4. Subtle lucencies in the pelvic bones are nonspecific and of questionable significance. Possibility metastatic disease or multiple myeloma should be considered. Clinical correlation is needed. ASSESSMENT/PLAN :  79-year-old female  Left breast invasive ductal carcinoma. She was stage I ER/UT positive HER-2/yu negative by FISH. S/p left breast lumpectomy in 2014  S/P radiation in 2015 and adjuvant Arimidex finished in February 2020  MGUS. She has had a stable M spike between 1.2 and 1.5 since 2015. Her most recent monoclonal protein levels were stable. - CTAP showed a 1.5 probable complex cyst in the right kidney. Subtle lucencies in the pelvic bones are nonspecific and of questionable significance   - CBC with borderline macrocytic anemia.  Calcium, total protein and renal function all WNL  - BCx +. Strep Pneumo. Rhinovirus also +. ID consulted. On Ceftriaxone   - SPEP, CRP/ESR pending  - Check CA 15-3 and CA 27.29  - Imaging is not that impressive for metastatic disease. However bone scan is appropriate  - She has no true CRAB criteria and reportedly M-spike has been stable. Will also check Igs, Beta 2 and K/L FLC        JESSA Etienne - CNP  Electronically signed 5/19/2021 at 11:42 AM  Pt seen and examined.  Note updated  Belkis Ewing MD

## 2021-05-19 NOTE — PROGRESS NOTES
PT having difficulty urinating this AM.  Straight cath x1 at 0150 with night nurse. Spoke to Dr. Long Mccartney, will place order for urology consult.

## 2021-05-19 NOTE — PROGRESS NOTES
05/18/21  1543   WBC 5.7   RBC 3.48*   HGB 11.6   HCT 35.2   .1*   MCH 33.3   MCHC 33.0   RDW 14.8      MPV 10.8       Radiology:     Assessment:    Active Problems:    Uncontrolled pain  Resolved Problems:    * No resolved hospital problems. *      Plan:  1. Streptococcus bacteremia: pt presented to the ER with c/o right sided back/ flank pain radiating around to abd. Reporting sudden in onset. Currently in town visiting family from [place]. H/o breast cancer in remission. Noted to have + rhinovirus/ enterovirus on Resp panel. Does report sore throat/ body aches. No obvious source of infection. CT abdomen shows no hydronephrosis- no nephrolithiasis. Subtle lucencies in the pelvic bones are non specific. Urinary retention this am- reporting no dysuria. But urinary hesitancy due to pain. ID consulted. Repeat BC ordered. Procal elevated. 2. Acute lower back pain uncontrolled: pain control. Await hem/ onc input on imaging. PT/ OT     3. Urinary retention: pt reporting difficulty urinating/ flank pain. Per pt no dysuria/ freq. C/o urinary hesitancy due to pain. CT abd showing probable complex cyst in the right kidney. Urology consulted. 3. + rhinovirus/ enterovirus: supportive treatment     4. Hypokalemia: supplement     5. H/o left breast invasive ductal carcinoma: reporting she has been in remission for 6 years. Follows with hem/onc in [place]    6. Asthma: no exacerbation        NOTE: This report was transcribed using voice recognition software. Every effort was made to ensure accuracy; however, inadvertent computerized transcription errors may be present.   Electronically signed by ARELY Nichols on 5/19/2021 at 11:20 AM            Addendum  I have personally participated in the history, exam, medical decision making with Riri Quintana NP on the date of service, I have personally reviewed imaging and lab data as well as EKG and I agree with all of the pertinent clinical information unless otherwise noted. I have also reviewed and agree with the past medical, family, and social history unless otherwise noted. Physical Exam  /73   Pulse 88   Temp 98.2 °F (36.8 °C) (Oral)   Resp 16   Ht 5' 6\" (1.676 m)   Wt 160 lb (72.6 kg)   SpO2 94%   BMI 25.82 kg/m²   Lungs are clear to auscultation bilaterally no crackles no wheezing. Heart normal S1-S2. Abdomen soft nontender nondistended positive bowel sounds. Extremities no peripheral edema, dec ROM RUE     Assessment / Plan  Active Problems:    Uncontrolled pain  Resolved Problems:    * No resolved hospital problems. *    60F PMH invasive ductal carcinoma, MGUS admitted w R flank / low back pain. CT showed some pelvic lucencies, hem/onc consulted for these and note they are \"nonspecific and of questionable significance. \"  Vitals showed low grade fever but procal 2.66, blood cx returned pos for Strep. Resp viral panel +rhinovirus but -COVID. ID consulted, blood cx being repeated and ortho consulted over concern for R shoulder septic arthritis which may be source of bacteremia.     Electronically signed by Olinda Prescott DO on 5/19/2021 at 2:30 PM

## 2021-05-19 NOTE — FLOWSHEET NOTE
Patient states that she was unable to void, she was previously straight in the ED. Dr. Sobia Portillo notified, order given for cath x1 with post void residual. Will continue to monitor.

## 2021-05-20 ENCOUNTER — APPOINTMENT (OUTPATIENT)
Dept: NUCLEAR MEDICINE | Age: 61
DRG: 502 | End: 2021-05-20
Payer: COMMERCIAL

## 2021-05-20 LAB
ANION GAP SERPL CALCULATED.3IONS-SCNC: 11 MMOL/L (ref 7–16)
BUN BLDV-MCNC: 8 MG/DL (ref 6–23)
CALCIUM SERPL-MCNC: 9.7 MG/DL (ref 8.6–10.2)
CHLORIDE BLD-SCNC: 94 MMOL/L (ref 98–107)
CO2: 27 MMOL/L (ref 22–29)
CREAT SERPL-MCNC: 0.7 MG/DL (ref 0.5–1)
GFR AFRICAN AMERICAN: >60
GFR NON-AFRICAN AMERICAN: >60 ML/MIN/1.73
GLUCOSE BLD-MCNC: 98 MG/DL (ref 74–99)
POTASSIUM SERPL-SCNC: 3.3 MMOL/L (ref 3.5–5)
SODIUM BLD-SCNC: 132 MMOL/L (ref 132–146)
STREP PNEUMONIAE ANTIGEN, URINE: NORMAL

## 2021-05-20 PROCEDURE — 6370000000 HC RX 637 (ALT 250 FOR IP)

## 2021-05-20 PROCEDURE — 82232 ASSAY OF BETA-2 PROTEIN: CPT

## 2021-05-20 PROCEDURE — 6360000002 HC RX W HCPCS: Performed by: SPECIALIST

## 2021-05-20 PROCEDURE — 99233 SBSQ HOSP IP/OBS HIGH 50: CPT | Performed by: INTERNAL MEDICINE

## 2021-05-20 PROCEDURE — 82784 ASSAY IGA/IGD/IGG/IGM EACH: CPT

## 2021-05-20 PROCEDURE — 87081 CULTURE SCREEN ONLY: CPT

## 2021-05-20 PROCEDURE — 6370000000 HC RX 637 (ALT 250 FOR IP): Performed by: INTERNAL MEDICINE

## 2021-05-20 PROCEDURE — 83883 ASSAY NEPHELOMETRY NOT SPEC: CPT

## 2021-05-20 PROCEDURE — G0378 HOSPITAL OBSERVATION PER HR: HCPCS

## 2021-05-20 PROCEDURE — 36415 COLL VENOUS BLD VENIPUNCTURE: CPT

## 2021-05-20 PROCEDURE — 2580000003 HC RX 258: Performed by: INTERNAL MEDICINE

## 2021-05-20 PROCEDURE — 3430000000 HC RX DIAGNOSTIC RADIOPHARMACEUTICAL: Performed by: RADIOLOGY

## 2021-05-20 PROCEDURE — 2580000003 HC RX 258: Performed by: SPECIALIST

## 2021-05-20 PROCEDURE — 87040 BLOOD CULTURE FOR BACTERIA: CPT

## 2021-05-20 PROCEDURE — A9503 TC99M MEDRONATE: HCPCS | Performed by: RADIOLOGY

## 2021-05-20 PROCEDURE — 1200000000 HC SEMI PRIVATE

## 2021-05-20 PROCEDURE — 80048 BASIC METABOLIC PNL TOTAL CA: CPT

## 2021-05-20 PROCEDURE — 86300 IMMUNOASSAY TUMOR CA 15-3: CPT

## 2021-05-20 PROCEDURE — 85027 COMPLETE CBC AUTOMATED: CPT

## 2021-05-20 PROCEDURE — 6360000002 HC RX W HCPCS: Performed by: INTERNAL MEDICINE

## 2021-05-20 PROCEDURE — 78306 BONE IMAGING WHOLE BODY: CPT

## 2021-05-20 PROCEDURE — 96372 THER/PROPH/DIAG INJ SC/IM: CPT

## 2021-05-20 RX ORDER — POTASSIUM CHLORIDE 20 MEQ/1
40 TABLET, EXTENDED RELEASE ORAL ONCE
Status: COMPLETED | OUTPATIENT
Start: 2021-05-20 | End: 2021-05-20

## 2021-05-20 RX ORDER — BISACODYL 10 MG
SUPPOSITORY, RECTAL RECTAL
Status: COMPLETED
Start: 2021-05-20 | End: 2021-05-20

## 2021-05-20 RX ORDER — BISACODYL 10 MG
10 SUPPOSITORY, RECTAL RECTAL DAILY PRN
Status: DISCONTINUED | OUTPATIENT
Start: 2021-05-20 | End: 2021-05-24 | Stop reason: HOSPADM

## 2021-05-20 RX ORDER — LIDOCAINE HYDROCHLORIDE 10 MG/ML
10 INJECTION, SOLUTION INFILTRATION; PERINEURAL ONCE
Status: DISCONTINUED | OUTPATIENT
Start: 2021-05-20 | End: 2021-05-24 | Stop reason: HOSPADM

## 2021-05-20 RX ORDER — SENNA PLUS 8.6 MG/1
1 TABLET ORAL 2 TIMES DAILY
Status: DISPENSED | OUTPATIENT
Start: 2021-05-20 | End: 2021-05-22

## 2021-05-20 RX ORDER — TC 99M MEDRONATE 20 MG/10ML
25 INJECTION, POWDER, LYOPHILIZED, FOR SOLUTION INTRAVENOUS
Status: COMPLETED | OUTPATIENT
Start: 2021-05-20 | End: 2021-05-20

## 2021-05-20 RX ORDER — METOCLOPRAMIDE 10 MG/1
5 TABLET ORAL
Status: DISPENSED | OUTPATIENT
Start: 2021-05-20 | End: 2021-05-21

## 2021-05-20 RX ADMIN — PANTOPRAZOLE SODIUM 40 MG: 40 TABLET, DELAYED RELEASE ORAL at 09:54

## 2021-05-20 RX ADMIN — METOCLOPRAMIDE 5 MG: 10 TABLET ORAL at 11:27

## 2021-05-20 RX ADMIN — TC 99M MEDRONATE 25 MILLICURIE: 20 INJECTION, POWDER, LYOPHILIZED, FOR SOLUTION INTRAVENOUS at 10:40

## 2021-05-20 RX ADMIN — OXYCODONE AND ACETAMINOPHEN 1 TABLET: 10; 325 TABLET ORAL at 21:39

## 2021-05-20 RX ADMIN — ENOXAPARIN SODIUM 40 MG: 40 INJECTION SUBCUTANEOUS at 09:54

## 2021-05-20 RX ADMIN — METOCLOPRAMIDE 5 MG: 10 TABLET ORAL at 17:03

## 2021-05-20 RX ADMIN — STANDARDIZED SENNA CONCENTRATE 8.6 MG: 8.6 TABLET ORAL at 21:40

## 2021-05-20 RX ADMIN — BISACODYL 10 MG: 10 SUPPOSITORY RECTAL at 09:52

## 2021-05-20 RX ADMIN — ACETAMINOPHEN 650 MG: 325 TABLET ORAL at 17:02

## 2021-05-20 RX ADMIN — POTASSIUM CHLORIDE 40 MEQ: 1500 TABLET, EXTENDED RELEASE ORAL at 11:26

## 2021-05-20 RX ADMIN — POLYETHYLENE GLYCOL 3350 17 G: 17 POWDER, FOR SOLUTION ORAL at 02:18

## 2021-05-20 RX ADMIN — ACETAMINOPHEN 650 MG: 325 TABLET ORAL at 09:54

## 2021-05-20 RX ADMIN — OXYCODONE AND ACETAMINOPHEN 1 TABLET: 10; 325 TABLET ORAL at 03:29

## 2021-05-20 RX ADMIN — STANDARDIZED SENNA CONCENTRATE 8.6 MG: 8.6 TABLET ORAL at 09:45

## 2021-05-20 RX ADMIN — Medication 10 MG: at 09:52

## 2021-05-20 RX ADMIN — Medication 10 ML: at 09:00

## 2021-05-20 RX ADMIN — WATER 2000 MG: 1 INJECTION INTRAMUSCULAR; INTRAVENOUS; SUBCUTANEOUS at 15:40

## 2021-05-20 ASSESSMENT — PAIN DESCRIPTION - FREQUENCY
FREQUENCY: INTERMITTENT
FREQUENCY: INTERMITTENT
FREQUENCY: CONTINUOUS
FREQUENCY: INTERMITTENT

## 2021-05-20 ASSESSMENT — PAIN DESCRIPTION - PAIN TYPE
TYPE: ACUTE PAIN

## 2021-05-20 ASSESSMENT — PAIN DESCRIPTION - LOCATION
LOCATION: BACK;SHOULDER
LOCATION: SHOULDER
LOCATION: BACK;SHOULDER
LOCATION: BACK;SHOULDER

## 2021-05-20 ASSESSMENT — PAIN SCALES - GENERAL
PAINLEVEL_OUTOF10: 6
PAINLEVEL_OUTOF10: 5
PAINLEVEL_OUTOF10: 0
PAINLEVEL_OUTOF10: 7
PAINLEVEL_OUTOF10: 1
PAINLEVEL_OUTOF10: 8
PAINLEVEL_OUTOF10: 7
PAINLEVEL_OUTOF10: 2
PAINLEVEL_OUTOF10: 4
PAINLEVEL_OUTOF10: 8
PAINLEVEL_OUTOF10: 6

## 2021-05-20 ASSESSMENT — PAIN DESCRIPTION - ORIENTATION: ORIENTATION: RIGHT

## 2021-05-20 ASSESSMENT — PAIN DESCRIPTION - PROGRESSION
CLINICAL_PROGRESSION: GRADUALLY WORSENING

## 2021-05-20 ASSESSMENT — PAIN DESCRIPTION - ONSET
ONSET: ON-GOING

## 2021-05-20 ASSESSMENT — PAIN DESCRIPTION - DESCRIPTORS
DESCRIPTORS: ACHING
DESCRIPTORS: ACHING;CONSTANT;SHARP

## 2021-05-20 ASSESSMENT — PAIN - FUNCTIONAL ASSESSMENT: PAIN_FUNCTIONAL_ASSESSMENT: PREVENTS OR INTERFERES WITH MANY ACTIVE NOT PASSIVE ACTIVITIES

## 2021-05-20 NOTE — CONSULTS
Jessica    Social History:    reports that she quit smoking about 44 years ago. Her smoking use included cigarettes. She started smoking about 46 years ago. She smoked 0.25 packs per day. She has never used smokeless tobacco. She reports previous alcohol use. She reports that she does not use drugs. Family History:   family history is not on file. REVIEW OF SYSTEMS:  As above in the HPI, otherwise negative    PHYSICAL EXAM:    Vitals:  /73   Pulse 77   Temp 97.8 °F (36.6 °C) (Axillary)   Resp 16   Ht 5' 6\" (1.676 m)   Wt 160 lb (72.6 kg)   SpO2 98%   BMI 25.82 kg/m²     General:  Awake, alert, oriented X 3. Well developed, well nourished, well groomed. No apparent distress. HEENT:  Normocephalic, atraumatic. Pupils equal, round, reactive to light. No scleral icterus. No conjunctival injection. Normal lips, teeth, and gums. No nasal discharge. Neck:  Supple  Heart:  RRR, no murmurs, gallops, or rubs  Lungs:  CTA bilaterally, bilat symmetrical expansion, no wheeze, rales, or rhonchi  Abdomen: Bowel sounds present, soft, nontender, no masses, no organomegaly, no peritoneal signs  Extremities:  No clubbing, cyanosis, or edema. All compartments soft. Right shoulder + subacromial swelling anteriolateral with warmth and diffuse tenderness. Patient is unable to raise her arm overhead due to pain. No strength testing performed.    Skin:  Warm and dry, no open lesions or rash  Neuro:  Cranial nerves 2-12 intact, no focal deficits  Breast: deferred  Rectal: deferred  Genitalia:  deferred    LABS:  CBC:   Lab Results   Component Value Date    WBC 3.6 05/20/2021    RBC 3.43 05/20/2021    HGB 11.8 05/20/2021    HCT 33.7 05/20/2021    MCV 98.3 05/20/2021    MCH 34.4 05/20/2021    MCHC 35.0 05/20/2021    RDW 14.4 05/20/2021     05/20/2021    MPV 10.0 05/20/2021     BMP:    Lab Results   Component Value Date     05/20/2021    K 3.3 05/20/2021    CL 94 05/20/2021    CO2 27 05/20/2021    BUN 8 05/20/2021    LABALBU 4.0 05/18/2021    CREATININE 0.7 05/20/2021    CALCIUM 9.7 05/20/2021    GFRAA >60 05/20/2021    LABGLOM >60 05/20/2021    GLUCOSE 98 05/20/2021   ESR 70  CRP 27.5  Prelim blood cultures from 5-19-21 show gram positive cocci in chains      CT scan of right shoulder done 5-19-21 shows moderate right shoulder synovial effusion, no bony erosive changes to suggest osteomyelitis, no soft tissue mass or evidence of abscess      ASSESSMENT:      Patient Active Problem List   Diagnosis    Uncontrolled pain    Intractable back pain       PLAN:    Right shoulder pain suspected septic arthritis  Strep pneumoniae bacteremia        Patient seen and labs/imaging/prior notes were reviewed. Exam and workup indicate a probable septic arthritis in the right shoulder. An attempted aspiration of the subacromial space was performed under sterile technique bedside today. Verbal consent obtained prior to procedure and site confirmed. The subacromial space was first anesthesized with 8 mL of 1% lidocaine. An attempted aspiration of the subacromial space was then performed and no fluid was able to be aspirated. Patient tolerated procedure well. Discussed plan with Dr Opal Villanueva and he has spoken to Dr Jeff Maria who will take over this patient's case. She will likely need either an open or arthroscopic I&D from here.      [employer] MIREYA

## 2021-05-20 NOTE — CONSULTS
Orthopaedic Consultation  Kamlesh Greenwood DO      CHIEF COMPLAINT: Right shoulder pain    History of Present Illness: Patient is a pleasant 19-year-old female who is seen today in consultation for right shoulder pain. She initially presented to the ED on Tuesday, 5/18/2021. This was after several days of sharp, acute flank pain on the right side. She does have a history of kidney stones and had similar complaints in the past.  Patient also admits to a recent history of chills. She denies significant sicknesses or significant infections in her past.  She has no known autoimmune conditions. She was subsequently admitted for further work-up and developed shoulder pain approximately 1 day ago. Subsequent imaging was obtained which revealed evidence of an effusion of the right shoulder. Blood cultures were also obtained as part of her work-up per the infectious disease team and these showed evidence of gram-positive cocci. A subsequent consult was placed to orthopedics. Patient denies the use of any IV drugs or exposure to insect/animal bites. She is not able to identify any source for recent infection. She currently has pain in the right shoulder with any attempted motion. Of note, she is right-hand dominant. Patient is visiting family as she typically resides in the [facility].         Past Medical History:   Diagnosis Date    Asthma     Cancer ([facility] [address]     Kidney stones          Past Surgical History:   Procedure Laterality Date    BREAST SURGERY      L lumpectomy        Medications Prior to Admission:    Medications Prior to Admission: omeprazole (PRILOSEC) 10 MG delayed release capsule, Take 20 mg by mouth every other day   fluticasone-vilanterol (BREO ELLIPTA) 100-25 MCG/INH AEPB inhaler, Inhale 1 puff into the lungs daily  albuterol sulfate HFA (PROAIR HFA) 108 (90 Base) MCG/ACT inhaler, Inhale 2 puffs into the lungs every 6 hours as needed for Wheezing  fluticasone (FLONASE) 50 MCG/ACT nasal spray, 1 spray by Each Nostril route daily as needed for Rhinitis  loratadine (CLARITIN) 10 MG capsule, Take 20 mg by mouth daily  vitamin D 25 MCG (1000 UT) CAPS, Take 1,000 Units by mouth daily  ALPRAZolam (XANAX) 0.25 MG tablet, Take 0.25 mg by mouth 3 times daily as needed for Sleep. Allergies:    Adhesive tape, Clarithromycin, and Codeine    Social History:    reports that she quit smoking about 44 years ago. Her smoking use included cigarettes. She started smoking about 46 years ago. She smoked 0.25 packs per day. She has never used smokeless tobacco. She reports previous alcohol use. She reports that she does not use drugs. Family History:   family history is not on file. REVIEW OF SYSTEMS:  As above in the HPI, otherwise negative    PHYSICAL EXAM:    Vitals:  /73   Pulse 77   Temp 97.8 °F (36.6 °C) (Axillary)   Resp 16   Ht 5' 6\" (1.676 m)   Wt 160 lb (72.6 kg)   SpO2 98%   BMI 25.82 kg/m²     General:  Awake, alert, oriented X 3. Well developed, well nourished, well groomed. No apparent distress. HEENT:  Normocephalic, atraumatic. Pupils equal, round, reactive to light. No scleral icterus. No conjunctival injection. Normal lips, teeth, and gums. No nasal discharge. Neck:  Supple  Heart:  RRR, no murmurs, gallops, or rubs  Lungs:  CTA bilaterally, bilat symmetrical expansion, no wheeze, rales, or rhonchi  Abdomen: Bowel sounds present, soft, nontender, no masses, no organomegaly, no peritoneal signs  Extremities: There is an effusion present about the right shoulder with a small area of faint erythema over the anterior aspect of the shoulder just distal to the coracoid near the deltopectoral interval.  Patient has poor tolerance to passive range of motion as she has pain with any attempted abduction or forward flexion. Skin is circumferentially intact. Small bandages noted from previous posterior subacromial aspiration attempt. All compartments soft compressible.   Sensation is grossly intact light touch, C5-T1. Radial pulses 2+. Intrinsic hand function intact, 5 out of 5. Skin:  Warm and dry, no open lesions or rash  Neuro:  Cranial nerves 2-12 intact, no focal deficits  Breast: deferred  Rectal: deferred  Genitalia:  deferred    LABS:  CBC:   Lab Results   Component Value Date    WBC 3.6 05/20/2021    RBC 3.43 05/20/2021    HGB 11.8 05/20/2021    HCT 33.7 05/20/2021    MCV 98.3 05/20/2021    MCH 34.4 05/20/2021    MCHC 35.0 05/20/2021    RDW 14.4 05/20/2021     05/20/2021    MPV 10.0 05/20/2021     BMP:    Lab Results   Component Value Date     05/20/2021    K 3.3 05/20/2021    CL 94 05/20/2021    CO2 27 05/20/2021    BUN 8 05/20/2021    LABALBU 4.0 05/18/2021    CREATININE 0.7 05/20/2021    CALCIUM 9.7 05/20/2021    GFRAA >60 05/20/2021    LABGLOM >60 05/20/2021    GLUCOSE 98 05/20/2021         ASSESSMENT:      Patient Active Problem List   Diagnosis    Uncontrolled pain    Intractable back pain     CT scan of the right shoulder was reviewed. There is an effusion about the right shoulder with no apparent osteomyelitis or bony destruction. PLAN:    Patient has a presentation certainly consistent with septic arthritis/effusion of the right shoulder. Effusion appears to be isolated to the glenohumeral joint. She has an elevated CRP/sed rate with a decreased white count. I was notified of this consult by one of my partners earlier this afternoon. Patient has not been kept n.p.o., and so the plan will be to keep her n.p.o. after midnight and proceed with surgery early tomorrow morning. I discussed irrigation debridement using arthroscopic assistance of the right shoulder with the patient. All risk, benefits alternatives to treatment including but not limited to loss of life, loss of limb, bleeding, infection, damage to surrounding structures, persistence of infection were reviewed. Informed consent was obtained. Antibiotic management per infectious disease.

## 2021-05-20 NOTE — PROGRESS NOTES
about 45 years ago. She smoked 0.25 packs per day. She has never used smokeless tobacco.  ETOH:   reports previous alcohol use. Home Medications  Prior to Admission medications    Medication Sig Start Date End Date Taking? Authorizing Provider   omeprazole (PRILOSEC) 10 MG delayed release capsule Take 20 mg by mouth every other day    Yes Historical Provider, MD   fluticasone-vilanterol (BREO ELLIPTA) 100-25 MCG/INH AEPB inhaler Inhale 1 puff into the lungs daily   Yes Historical Provider, MD   albuterol sulfate HFA (PROAIR HFA) 108 (90 Base) MCG/ACT inhaler Inhale 2 puffs into the lungs every 6 hours as needed for Wheezing   Yes Historical Provider, MD   fluticasone (FLONASE) 50 MCG/ACT nasal spray 1 spray by Each Nostril route daily as needed for Rhinitis   Yes Historical Provider, MD   loratadine (CLARITIN) 10 MG capsule Take 20 mg by mouth daily   Yes Historical Provider, MD   vitamin D 25 MCG (1000 UT) CAPS Take 1,000 Units by mouth daily   Yes Historical Provider, MD   ALPRAZolam (XANAX) 0.25 MG tablet Take 0.25 mg by mouth 3 times daily as needed for Sleep. Yes Historical Provider, MD       Allergies  Allergies   Allergen Reactions    Adhesive Tape      Other reaction(s): Dermatitis    Clarithromycin Nausea Only     Other reaction(s): severe gas, heartburn    Codeine Nausea Only     Other reaction(s): GI Intolerance, upset stomach       Review of Systems:  Lower back pain, nausea, fatigue. Objective  /73   Pulse 77   Temp 97.8 °F (36.6 °C) (Axillary)   Resp 16   Ht 5' 6\" (1.676 m)   Wt 160 lb (72.6 kg)   SpO2 98%   BMI 25.82 kg/m²     Physical Exam:   Performance Status:  General: AAO to person, place, time, in no acute distress,   Head and neck : PERRLA, EOMI . Sclera non icteric. Oropharynx : Clear  Neck: no JVD,  no adenopathy  Heart: Regular rate and regular rhythm, no murmur  Lungs: Clear to auscultation   Extremities: No edema,no cyanosis, no clubbing.    Abdomen: Soft, non-tender;no masses, no organomegaly  Skin:  No rash. Neurologic:Cranial nerves grossly intact. No focal motor or sensory deficits . Recent Laboratory Data-   Lab Results   Component Value Date    WBC 3.6 (L) 05/20/2021    HGB 11.8 05/20/2021    HCT 33.7 (L) 05/20/2021    MCV 98.3 05/20/2021     05/20/2021    LYMPHOPCT 3.5 (L) 05/18/2021    RBC 3.43 (L) 05/20/2021    MCH 34.4 05/20/2021    MCHC 35.0 (H) 05/20/2021    RDW 14.4 05/20/2021    NEUTOPHILPCT 93.2 (H) 05/18/2021    MONOPCT 2.6 05/18/2021    BASOPCT 0.2 05/18/2021    NEUTROABS 5.35 05/18/2021    LYMPHSABS 0.20 (L) 05/18/2021    MONOSABS 0.15 05/18/2021    EOSABS 0.00 (L) 05/18/2021    BASOSABS 0.01 05/18/2021       Lab Results   Component Value Date     05/18/2021    K 3.4 (L) 05/18/2021     05/18/2021    CO2 22 05/18/2021    BUN 12 05/18/2021    CREATININE 0.7 05/18/2021    GLUCOSE 141 (H) 05/18/2021    CALCIUM 9.5 05/18/2021    PROT 7.7 05/18/2021    LABALBU 4.0 05/18/2021    BILITOT 1.3 (H) 05/18/2021    ALKPHOS 55 05/18/2021    AST 53 (H) 05/18/2021    ALT 39 (H) 05/18/2021    LABGLOM >60 05/18/2021    GFRAA >60 05/18/2021       No results found for: IRON, TIBC, FERRITIN        Radiology-    CT ABDOMEN PELVIS WO CONTRAST Additional Contrast? None    Result Date: 5/18/2021  EXAMINATION: CT OF THE ABDOMEN AND PELVIS WITHOUT CONTRAST 5/18/2021 4:43 pm TECHNIQUE: CT of the abdomen and pelvis was performed without the administration of intravenous contrast. Multiplanar reformatted images are provided for review. Dose modulation, iterative reconstruction, and/or weight based adjustment of the mA/kV was utilized to reduce the radiation dose to as low as reasonably achievable. COMPARISON: None.  HISTORY: ORDERING SYSTEM PROVIDED HISTORY: right flank pain, hx kidney stones TECHNOLOGIST PROVIDED HISTORY: Reason for exam:->right flank pain, hx kidney stones Additional Contrast?->None Decision Support Exception - unselect if not a suspected or confirmed emergency medical condition->Emergency Medical Condition (MA) FINDINGS: Lower Chest: The lung bases are clear. The heart is normal in size. No pleural or pericardial effusion. Organs: Liver: Unremarkable. Gallbladder: Unremarkable. Pancreas: Unremarkable. Spleen:  Unremarkable. Adrenals: Unremarkable. Kidneys: Normal in size and contour. 1.4 cm hypodense lesion along the midpole is fatty and consistent with an angiomyolipoma. No stone or hydronephrosis. 1.5 cm mildly hypodense lesion along the upper pole of the right kidney (series 2, image 48) is nonspecific. It may represent a complex cyst. GI/Bowel: No bowel wall thickening or obstruction. Normal appendix. Pelvis: The urinary bladder is unremarkable. Within limits of the CT technique, the uterus and the adnexa are grossly unremarkable. Peritoneum/Retroperitoneum: No lymphadenopathy. No free air or free fluid is seen. The abdominal aorta and pelvic arteries are unremarkable. Bones/Soft Tissues:  No fracture or joint dislocation is seen. Subtle lucencies in the pelvic bones are nonspecific. 1.  No acute abnormality is seen in the abdomen or the pelvis. 2. No urolithiasis or hydronephrosis 3. 1.5 probable complex cyst in the right kidney. Sonographic evaluation recommended. 4. Subtle lucencies in the pelvic bones are nonspecific and of questionable significance. Possibility metastatic disease or multiple myeloma should be considered. Clinical correlation is needed. ASSESSMENT/PLAN :  78-year-old female  Left breast invasive ductal carcinoma. She was stage I ER/TN positive HER-2/yu negative by FISH. S/p left breast lumpectomy in 2014  S/P radiation in 2015 and adjuvant Arimidex finished in February 2020  MGUS. She has had a stable M spike between 1.2 and 1.5 since 2015. Her most recent monoclonal protein levels were stable. - CTAP showed a 1.5 probable complex cyst in the right kidney.  Subtle lucencies in the pelvic bones are nonspecific and of questionable significance   - CBC with borderline macrocytic anemia. Calcium, total protein and renal function all WNL  - BCx +. Strep Pneumo. Rhinovirus also +. ID consulted. On Ceftriaxone   - SPEP, CRP/ESR pending  - Check CA 15-3 and CA 27.29  - Imaging is not that impressive for metastatic disease. However bone scan is appropriate  - She has no true CRAB criteria and reportedly M-spike has been stable. Will also check Igs, Beta 2 and K/L FLC    5/20/2021  -ESR 70  -CRP 27.5  -SPEP, CA 15-3, CA 27.29, Ig's, beta-2, and K/L FLC all still pending  -ID following remains on ceftriaxone  - sp ECHO EF is visually estimated at 60% with normal left ventricular diastolic filling pattern .  -Status post bone scan today. Results pending. However Imaging is not that impressive for metastatic disease.    - She has no true CRAB criteria and reportedly M-spike has been stable. Blood work pending.   - We will continue to follow    JESSA Hein - SABRINA  Electronically signed 5/20/2021 at 9:48 AM  Pt seen and examined.  Note updated  Xenia Valentine MD

## 2021-05-20 NOTE — CARE COORDINATION
Updated discharge plan of care. Pt for possible bone scan today. Continue iv antibiotics. Pending ortho consult.  Will follow-KRISTIEO

## 2021-05-20 NOTE — FLOWSHEET NOTE
Patient complained that she is unable to pass stool and that her abdomen feels gassy and distended. Dr. Alexander Austin notified and tap water enema ordered. Enema given with no results abdomen still distended. Smears of stool noted. She was given PRN glycolax. Will continue to monitor.

## 2021-05-20 NOTE — PROGRESS NOTES
[address] [place] [address] [facility]  [facility]  Progress Note    CC: not feeling well, + shoulder pain  Face to face encounter   SUBJECTIVE:  Patient is tolerating medications. No reported adverse drug reactions. ROS: No nausea, vomiting, diarrhea. No rash.  + pain to right shoulder- right pelvic area. Not feeling well today   Tmax- last evening- 99.5    Medications:  Scheduled Meds:   metoclopramide  5 mg Oral TID AC    senna  1 tablet Oral BID    potassium chloride  40 mEq Oral Once    cefTRIAXone (ROCEPHIN) IV  2,000 mg Intravenous Q24H    pantoprazole  40 mg Oral QAM AC    sodium chloride flush  10 mL Intravenous 2 times per day    enoxaparin  40 mg Subcutaneous Daily    lidocaine  1 patch Transdermal Daily     Continuous Infusions:   sodium chloride       PRN Meds:bisacodyl, albuterol, sodium chloride flush, sodium chloride, polyethylene glycol, acetaminophen **OR** acetaminophen, ibuprofen, oxyCODONE-acetaminophen, oxyCODONE-acetaminophen, morphine  OBJECTIVE:  Patient Vitals for the past 24 hrs:   BP Temp Temp src Pulse Resp SpO2   05/20/21 0930 124/73 97.8 °F (36.6 °C) Axillary 77 16 98 %   05/20/21 0315 (!) 139/90 99 °F (37.2 °C) Oral 84 16 99 %   05/19/21 1915 131/78 99.5 °F (37.5 °C) Temporal 79 16 97 %     Constitutional: The patient is awake, alert, and oriented. + pain, pelvic area and right shoulder   Skin: Warm and dry. No rashes were noted. Head: Eyes show round, and reactive pupils. No jaundice. Mouth: Moist mucous membranes, no ulcerations, no thrush. Neck: Supple to movements. No lymphadenopathy. Chest: No use of accessory muscles to breathe. Symmetrical expansion. Auscultation reveals no wheezing, crackles, or rhonchi. Cardiovascular: S1 and S2 are rhythmic and regular. No murmurs appreciated. Abdomen: Positive bowel sounds to auscultation. Benign to palpation. Extremities: No clubbing, no cyanosis, no edema.   Right shoulder- with + erythema, Tender to touch, unable to  her arm or lift it up, + pain  De Jesus -yellow urine  PIV    Laboratory and Tests Review:  Lab Results   Component Value Date    WBC 3.6 (L) 05/20/2021    WBC 5.7 05/18/2021    HGB 11.8 05/20/2021    HCT 33.7 (L) 05/20/2021    MCV 98.3 05/20/2021     05/20/2021     Lab Results   Component Value Date    NEUTROABS 5.35 05/18/2021     Lab Results   Component Value Date    CRP 27.5 (H) 05/19/2021     Lab Results   Component Value Date    SEDRATE 70 (H) 05/19/2021     Lab Results   Component Value Date    ALT 39 (H) 05/18/2021    AST 53 (H) 05/18/2021    ALKPHOS 55 05/18/2021    BILITOT 1.3 (H) 05/18/2021     Lab Results   Component Value Date     05/20/2021    K 3.3 05/20/2021    CL 94 05/20/2021    CO2 27 05/20/2021    BUN 8 05/20/2021    CREATININE 0.7 05/20/2021    GFRAA >60 05/20/2021    LABGLOM >60 05/20/2021    GLUCOSE 98 05/20/2021    PROT 7.7 05/18/2021    LABALBU 4.0 05/18/2021    CALCIUM 9.7 05/20/2021    BILITOT 1.3 05/18/2021    ALKPHOS 55 05/18/2021    AST 53 05/18/2021    ALT 39 05/18/2021     Radiology:  Reviewed     Microbiology:   5/18/2021-blood culture- Gram positive cocci in chains - strep pneumoniae   5/19/2021- blood cx- gram positive cocci in chains   Strep pneumoniae antigen- pending     Respiratory panel- ++ enterovirus/ Rhinovirus     ASSESSMENT:  · High grade Strep Pneumoniae Bacteremia   · Right septic shoulder arthritis   · Right pelvic pain  · Leukopenia   · Rhinovirus/ enterovirus   · History of breast cancer     PLAN:  · Continue Ceftriaxone   · Check cultures  · Oncology following - for bone scan   · Repeat blood cultures in am  · Ortho consulted to evaluate right shoulder   · Monitor labs- sed rate- 70  crp-27.5    procal- 2.66   · 2D echo did not show vegetations. We will ask for a HUSSEIN    JESSA Hansen CNS  10:05 AM  5/20/2021     Patient seen and examined. I had a face to face encounter with the patient.  Agree with exam.  Assessment and plan as outlined above and directed by me. Addition and corrections were done as deemed appropriate. My exam and plan include: The patient had a failed attempt of an arthrocentesis today. Reported there will be another attempt. She is tolerating Ceftriaxone. Left shoulder is  and slightly erythematous. We will repeat blood cultures today.       Rhoda Runner, MD  5/20/2021  4:41 PM

## 2021-05-20 NOTE — PROGRESS NOTES
[facility]  PROGRESS NOTE    Chief Complaint:   Urinary Retention    HPI:   She is currently comfortable and tolerating the De Jesus    Vitals:    21 0930   BP: 124/73   Pulse: 77   Resp: 16   Temp: 97.8 °F (36.6 °C)   SpO2: 98%       Allergies: Adhesive tape, Clarithromycin, and Codeine    PAST MEDICAL HISTORY:   Past Medical History:   Diagnosis Date    Asthma     Cancer ([address]     Kidney stones        PAST SURGICAL HISTORY:   Past Surgical History:   Procedure Laterality Date    BREAST SURGERY      L lumpectomy         PAST FAMILY HISTORY:  History reviewed. No pertinent family history. PAST SOCIAL HISTORY:    Social History     Socioeconomic History    Marital status:      Spouse name: None    Number of children: None    Years of education: None    Highest education level: None   Occupational History    None   Tobacco Use    Smoking status: Former Smoker     Packs/day: 0.25     Types: Cigarettes     Start date: 1975     Quit date: 1977     Years since quittin.0    Smokeless tobacco: Never Used    Tobacco comment: social smoker   Vaping Use    Vaping Use: Never used   Substance and Sexual Activity    Alcohol use: Not Currently    Drug use: Never    Sexual activity: Not Currently     Partners: Male   Other Topics Concern    None   Social History Narrative    None     Social Determinants of Health     Financial Resource Strain:     Difficulty of Paying Living Expenses:    Food Insecurity:     Worried About Running Out of Food in the Last Year:     [address] in the Last Year:    Transportation Needs:     Lack of Transportation (Medical):      Lack of Transportation (Non-Medical):    Physical Activity:     Days of Exercise per Week:     Minutes of Exercise per Session:    Stress:     Feeling of Stress :    Social Connections:     Frequency of Communication with Friends and Family:     Frequency of Social Gatherings with Friends and Family:     Attends [employer] Services:     Active Member of Clubs or Organizations:     Attends Club or Organization Meetings:     Marital Status:    Intimate Partner Violence:     Fear of Current or Ex-Partner:     Emotionally Abused:     Physically Abused:     Sexually Abused:        IV:    sodium chloride         PRN: bisacodyl, albuterol, sodium chloride flush, sodium chloride, polyethylene glycol, acetaminophen **OR** acetaminophen, ibuprofen, oxyCODONE-acetaminophen, oxyCODONE-acetaminophen, morphine    Scheduled:    metoclopramide  5 mg Oral TID AC    senna  1 tablet Oral BID    lidocaine  10 mL Intra-articular Once    cefTRIAXone (ROCEPHIN) IV  2,000 mg Intravenous Q24H    pantoprazole  40 mg Oral QAM AC    sodium chloride flush  10 mL Intravenous 2 times per day    enoxaparin  40 mg Subcutaneous Daily    lidocaine  1 patch Transdermal Daily       Lab Results   Component Value Date     05/20/2021    K 3.3 05/20/2021    BUN 8 05/20/2021    CREATININE 0.7 05/20/2021        Lab Results   Component Value Date    HGB 11.8 05/20/2021    HCT 33.7 05/20/2021       Review Of Systems:  Constitutional: Tired  Eyes: negative  Ears, nose, mouth, throat, and face: negative  Respiratory: Asthma  Cardiovascular: negative  Gastrointestinal: negative  Genitourinary: Kidney stones  Musculoskeletal: negative  Neurological: negative  Behavioral/Psych: negative  Endocrine: negative    Physical Exam:  Skin is dry, and without rashes  Respirations are non-labored, intact  Abdomen is soft, non-tender, non-distended. Active bowel sounds are present. No rebound or guarding  Alert and oriented x 3. No focal motor/sensory deficits  De Jesus catheter is draining clear, yellow urine    Assessment and Plan:  Urinary Retention  -Urine culture on 5/18/2021 was negative. Finish course of antibiotics  -Voiding trial when she is more ambulatory and ready for discharge  -We will follow her during her hospital stay.   She will require follow-up with her urologist in [place] once she returns    Cynthia Rosas MD  5/20/2021  4:50 PM

## 2021-05-20 NOTE — PROGRESS NOTES
[facility]   Progress Note    Admitting Date and Time: 5/18/2021  2:59 PM  Admit Dx: Uncontrolled pain [R52]    Subjective: Admitted on 18th, came with back pain, patient with stage I left invasive ductal carcinoma, s/p lumpectomy and XRT, on Arimidex, also has MGUS, did have chills, tobacco use in past, lucencies noted on CT pelvis, patient with Streptococcus bacteremia, issues with urinary retention, seen by urology, creatinine stable, no evidence of obstructive uropathy, De Jesus catheter continued, voiding trials when patient ambulatory, no further  interventions planned, positive for rhinovirus, seen by oncology, lucencies in pelvic bones are nonspecific, patient with stable recent monoclonal protein levels. Seen by ID, high-grade strep pneumoniae bacteremia, suspected right shoulder septic arthritis, blood cultures repeated, orthopedic surgery on consult for right shoulder arthrocentesis. Patient was admitted with Uncontrolled pain [R52]. Patient feels comfortable, at this time awake, alert, resting in bed, does communicate well, as per patient last BM on Monday, normally does get daily bowel movements. Does say she is passing flatus. Per RN: Patient with constipation, Dulcolax suppository was ordered. ROS: denies fever, chills, cp, sob, n/v, HA unless stated above.      cefTRIAXone (ROCEPHIN) IV  2,000 mg Intravenous Q24H    pantoprazole  40 mg Oral QAM AC    sodium chloride flush  10 mL Intravenous 2 times per day    enoxaparin  40 mg Subcutaneous Daily    lidocaine  1 patch Transdermal Daily     albuterol, 2.5 mg, Q6H PRN  sodium chloride flush, 10 mL, PRN  sodium chloride, 25 mL, PRN  polyethylene glycol, 17 g, Daily PRN  acetaminophen, 650 mg, Q6H PRN   Or  acetaminophen, 650 mg, Q6H PRN  ibuprofen, 400 mg, Q6H PRN  oxyCODONE-acetaminophen, 1 tablet, Q4H PRN  oxyCODONE-acetaminophen, 1 tablet, Q4H PRN  morphine, 2 mg, Q4H PRN         Objective:    BP (!) 139/90 Pulse 84   Temp 99 °F (37.2 °C) (Oral)   Resp 16   Ht 5' 6\" (1.676 m)   Wt 160 lb (72.6 kg)   SpO2 99%   BMI 25.82 kg/m²   General Appearance: alert and oriented to person, place and time, well-developed and well-nourished, in no acute distress  Skin: warm and dry, no rash or erythema  Head: normocephalic and atraumatic  Eyes: pupils equal, round, and reactive to light, extraocular eye movements intact, conjunctivae normal  ENT: tympanic membrane, external ear and ear canal normal bilaterally, oropharynx clear and moist with normal mucous membranes  Neck: neck supple and non tender without mass, no thyromegaly or thyroid nodules, no cervical lymphadenopathy   Pulmonary/Chest: clear to auscultation bilaterally- no wheezes, rales or rhonchi, normal air movement, no respiratory distress  Cardiovascular: normal rate, normal S1 and S2, no gallops, intact distal pulses and no carotid bruits  Abdomen: soft, non-tender, non-distended, normal bowel sounds, no masses or organomegaly      Recent Labs     05/18/21  1543      K 3.4*      CO2 22   BUN 12   CREATININE 0.7   GLUCOSE 141*   CALCIUM 9.5       Recent Labs     05/18/21  1543   WBC 5.7   RBC 3.48*   HGB 11.6   HCT 35.2   .1*   MCH 33.3   MCHC 33.0   RDW 14.8      MPV 10.8     CRP 27.5  ESR 70  CT right shoulder with synovial effusion, no erosive changes suggestive of osteomyelitis. No soft tissue mass or evidence of abscess. Echo with normal LV, no wall motion abnormality, normal EF, normal diastolic function. Radiology:   CT SHOULDER RIGHT W WO CONTRAST   Final Result   1. Moderate right shoulder synovial effusion. 2.  No bony erosive changes to suggest osteomyelitis. 3.  No soft tissue mass or evidence of abscess. CT ABDOMEN PELVIS WO CONTRAST Additional Contrast? None   Final Result   1. No acute abnormality is seen in the abdomen or the pelvis. 2. No urolithiasis or hydronephrosis   3.  1.5 probable complex cyst in the right kidney. Sonographic evaluation   recommended. 4. Subtle lucencies in the pelvic bones are nonspecific and of questionable   significance. Possibility metastatic disease or multiple myeloma should be   considered. Clinical correlation is needed. NM BONE SCAN WHOLE BODY    (Results Pending)       Assessment:    Active Problems:    Uncontrolled pain    Intractable back pain  Resolved Problems:    * No resolved hospital problems. *      Plan:  1. Constipation, patient had a suppository, could be related to the use of pain medications, will add MiraLAX as well as senna. Also Reglan in low doses. 2. Strep bacteremia, elevated procalcitonin, increased CRP, patient on ceftriaxone day 2, ID on board, final sensitivity pending. 3. Rhinovirus infection, patient remains on supportive care as well as isolation, no change. 4. Patient with known ductal carcinoma of breast, also MUGS, radiolucencies on pelvic CT-due as per oncology nonspecific, bone scan pending. 5. Suspected right shoulder septic arthritis, Ortho evaluation pending, CT negative, for now will wait for orthopedic input. 6. Electrolyte imbalance, hypokalemia, supplement. 7. DVT prophylaxis with Lovenox.         Electronically signed by Nina Farias MD on 5/20/2021 at 8:28 AM

## 2021-05-21 ENCOUNTER — ANESTHESIA EVENT (OUTPATIENT)
Dept: OPERATING ROOM | Age: 61
DRG: 502 | End: 2021-05-21
Payer: COMMERCIAL

## 2021-05-21 ENCOUNTER — ANESTHESIA (OUTPATIENT)
Dept: NON INVASIVE DIAGNOSTICS | Age: 61
DRG: 502 | End: 2021-05-21
Payer: COMMERCIAL

## 2021-05-21 ENCOUNTER — ANESTHESIA (OUTPATIENT)
Dept: OPERATING ROOM | Age: 61
DRG: 502 | End: 2021-05-21
Payer: COMMERCIAL

## 2021-05-21 ENCOUNTER — APPOINTMENT (OUTPATIENT)
Dept: NON INVASIVE DIAGNOSTICS | Age: 61
DRG: 502 | End: 2021-05-21
Payer: COMMERCIAL

## 2021-05-21 ENCOUNTER — ANESTHESIA EVENT (OUTPATIENT)
Dept: NON INVASIVE DIAGNOSTICS | Age: 61
DRG: 502 | End: 2021-05-21
Payer: COMMERCIAL

## 2021-05-21 VITALS
OXYGEN SATURATION: 100 % | SYSTOLIC BLOOD PRESSURE: 119 MMHG | RESPIRATION RATE: 33 BRPM | DIASTOLIC BLOOD PRESSURE: 71 MMHG

## 2021-05-21 VITALS — OXYGEN SATURATION: 100 % | DIASTOLIC BLOOD PRESSURE: 80 MMHG | SYSTOLIC BLOOD PRESSURE: 130 MMHG

## 2021-05-21 LAB
ALBUMIN SERPL-MCNC: 3.1 G/DL (ref 3.5–4.7)
ALPHA-1-GLOBULIN: 0.3 G/DL (ref 0.2–0.4)
ALPHA-2-GLOBULIN: 0.9 G/FL (ref 0.5–1)
ANION GAP SERPL CALCULATED.3IONS-SCNC: 11 MMOL/L (ref 7–16)
BETA GLOBULIN: 0.9 G/DL (ref 0.8–1.3)
BLOOD CULTURE, ROUTINE: ABNORMAL
BLOOD CULTURE, ROUTINE: ABNORMAL
BUN BLDV-MCNC: 11 MG/DL (ref 6–23)
CALCIUM SERPL-MCNC: 9.7 MG/DL (ref 8.6–10.2)
CHLORIDE BLD-SCNC: 97 MMOL/L (ref 98–107)
CO2: 27 MMOL/L (ref 22–29)
CREAT SERPL-MCNC: 0.7 MG/DL (ref 0.5–1)
CULTURE, BLOOD 2: ABNORMAL
ELECTROPHORESIS: ABNORMAL
GAMMA GLOBULIN: 1.5 G/DL (ref 0.7–1.6)
GFR AFRICAN AMERICAN: >60
GFR NON-AFRICAN AMERICAN: >60 ML/MIN/1.73
GLUCOSE BLD-MCNC: 118 MG/DL (ref 74–99)
IGA: 27 MG/DL (ref 70–400)
IGA: 28 MG/DL (ref 70–400)
IGG: 1900 MG/DL (ref 700–1600)
IGG: 1907 MG/DL (ref 700–1600)
IGM: 16 MG/DL (ref 40–230)
IGM: 18 MG/DL (ref 40–230)
IMMUNOFIXATION RESULT, SERUM: NORMAL
ORGANISM: ABNORMAL
POTASSIUM SERPL-SCNC: 4 MMOL/L (ref 3.5–5)
SODIUM BLD-SCNC: 135 MMOL/L (ref 132–146)
TOTAL PROTEIN: 6.8 G/DL (ref 6.4–8.3)
URINE CULTURE, ROUTINE: ABNORMAL

## 2021-05-21 PROCEDURE — 6360000002 HC RX W HCPCS: Performed by: SPECIALIST

## 2021-05-21 PROCEDURE — 87205 SMEAR GRAM STAIN: CPT

## 2021-05-21 PROCEDURE — 2500000003 HC RX 250 WO HCPCS

## 2021-05-21 PROCEDURE — 0MB14ZZ EXCISION OF RIGHT SHOULDER BURSA AND LIGAMENT, PERCUTANEOUS ENDOSCOPIC APPROACH: ICD-10-PCS | Performed by: GENERAL ACUTE CARE HOSPITAL

## 2021-05-21 PROCEDURE — 2709999900 HC NON-CHARGEABLE SUPPLY: Performed by: GENERAL ACUTE CARE HOSPITAL

## 2021-05-21 PROCEDURE — 93325 DOPPLER ECHO COLOR FLOW MAPG: CPT

## 2021-05-21 PROCEDURE — 93320 DOPPLER ECHO COMPLETE: CPT

## 2021-05-21 PROCEDURE — 36415 COLL VENOUS BLD VENIPUNCTURE: CPT

## 2021-05-21 PROCEDURE — 2580000003 HC RX 258: Performed by: INTERNAL MEDICINE

## 2021-05-21 PROCEDURE — 6370000000 HC RX 637 (ALT 250 FOR IP): Performed by: INTERNAL MEDICINE

## 2021-05-21 PROCEDURE — 87116 MYCOBACTERIA CULTURE: CPT

## 2021-05-21 PROCEDURE — 6360000002 HC RX W HCPCS

## 2021-05-21 PROCEDURE — 6360000002 HC RX W HCPCS: Performed by: NURSE ANESTHETIST, CERTIFIED REGISTERED

## 2021-05-21 PROCEDURE — 87015 SPECIMEN INFECT AGNT CONCNTJ: CPT

## 2021-05-21 PROCEDURE — 93312 ECHO TRANSESOPHAGEAL: CPT

## 2021-05-21 PROCEDURE — 87070 CULTURE OTHR SPECIMN AEROBIC: CPT

## 2021-05-21 PROCEDURE — 3700000001 HC ADD 15 MINUTES (ANESTHESIA)

## 2021-05-21 PROCEDURE — 99233 SBSQ HOSP IP/OBS HIGH 50: CPT | Performed by: INTERNAL MEDICINE

## 2021-05-21 PROCEDURE — 7100000001 HC PACU RECOVERY - ADDTL 15 MIN: Performed by: GENERAL ACUTE CARE HOSPITAL

## 2021-05-21 PROCEDURE — 7100000011 HC PHASE II RECOVERY - ADDTL 15 MIN

## 2021-05-21 PROCEDURE — 6360000002 HC RX W HCPCS: Performed by: INTERNAL MEDICINE

## 2021-05-21 PROCEDURE — 7100000010 HC PHASE II RECOVERY - FIRST 15 MIN

## 2021-05-21 PROCEDURE — 87102 FUNGUS ISOLATION CULTURE: CPT

## 2021-05-21 PROCEDURE — 87206 SMEAR FLUORESCENT/ACID STAI: CPT

## 2021-05-21 PROCEDURE — 2580000003 HC RX 258

## 2021-05-21 PROCEDURE — B24BZZ4 ULTRASONOGRAPHY OF HEART WITH AORTA, TRANSESOPHAGEAL: ICD-10-PCS | Performed by: INTERNAL MEDICINE

## 2021-05-21 PROCEDURE — 3600000002 HC SURGERY LEVEL 2 BASE: Performed by: GENERAL ACUTE CARE HOSPITAL

## 2021-05-21 PROCEDURE — 2580000003 HC RX 258: Performed by: SPECIALIST

## 2021-05-21 PROCEDURE — 3700000000 HC ANESTHESIA ATTENDED CARE

## 2021-05-21 PROCEDURE — 2500000003 HC RX 250 WO HCPCS: Performed by: NURSE ANESTHETIST, CERTIFIED REGISTERED

## 2021-05-21 PROCEDURE — 2580000003 HC RX 258: Performed by: NURSE ANESTHETIST, CERTIFIED REGISTERED

## 2021-05-21 PROCEDURE — 1200000000 HC SEMI PRIVATE

## 2021-05-21 PROCEDURE — 3700000001 HC ADD 15 MINUTES (ANESTHESIA): Performed by: GENERAL ACUTE CARE HOSPITAL

## 2021-05-21 PROCEDURE — 3700000000 HC ANESTHESIA ATTENDED CARE: Performed by: GENERAL ACUTE CARE HOSPITAL

## 2021-05-21 PROCEDURE — 7100000000 HC PACU RECOVERY - FIRST 15 MIN: Performed by: GENERAL ACUTE CARE HOSPITAL

## 2021-05-21 PROCEDURE — 3600000012 HC SURGERY LEVEL 2 ADDTL 15MIN: Performed by: GENERAL ACUTE CARE HOSPITAL

## 2021-05-21 PROCEDURE — 0R9J4ZZ DRAINAGE OF RIGHT SHOULDER JOINT, PERCUTANEOUS ENDOSCOPIC APPROACH: ICD-10-PCS | Performed by: GENERAL ACUTE CARE HOSPITAL

## 2021-05-21 PROCEDURE — L3650 SO 8 ABD RESTRAINT PRE OTS: HCPCS | Performed by: GENERAL ACUTE CARE HOSPITAL

## 2021-05-21 PROCEDURE — 80048 BASIC METABOLIC PNL TOTAL CA: CPT

## 2021-05-21 RX ORDER — SODIUM CHLORIDE 9 MG/ML
INJECTION, SOLUTION INTRAVENOUS CONTINUOUS PRN
Status: DISCONTINUED | OUTPATIENT
Start: 2021-05-21 | End: 2021-05-21 | Stop reason: SDUPTHER

## 2021-05-21 RX ORDER — SODIUM CHLORIDE 0.9 % (FLUSH) 0.9 %
5-40 SYRINGE (ML) INJECTION PRN
Status: DISCONTINUED | OUTPATIENT
Start: 2021-05-21 | End: 2021-05-24 | Stop reason: HOSPADM

## 2021-05-21 RX ORDER — MORPHINE SULFATE 2 MG/ML
2 INJECTION, SOLUTION INTRAMUSCULAR; INTRAVENOUS
Status: DISCONTINUED | OUTPATIENT
Start: 2021-05-21 | End: 2021-05-24 | Stop reason: HOSPADM

## 2021-05-21 RX ORDER — FENTANYL CITRATE 50 UG/ML
INJECTION, SOLUTION INTRAMUSCULAR; INTRAVENOUS PRN
Status: DISCONTINUED | OUTPATIENT
Start: 2021-05-21 | End: 2021-05-21 | Stop reason: SDUPTHER

## 2021-05-21 RX ORDER — ONDANSETRON 2 MG/ML
4 INJECTION INTRAMUSCULAR; INTRAVENOUS
Status: DISCONTINUED | OUTPATIENT
Start: 2021-05-21 | End: 2021-05-21 | Stop reason: HOSPADM

## 2021-05-21 RX ORDER — ONDANSETRON 2 MG/ML
4 INJECTION INTRAMUSCULAR; INTRAVENOUS EVERY 6 HOURS PRN
Status: DISCONTINUED | OUTPATIENT
Start: 2021-05-21 | End: 2021-05-24 | Stop reason: HOSPADM

## 2021-05-21 RX ORDER — SODIUM CHLORIDE 0.9 % (FLUSH) 0.9 %
5-40 SYRINGE (ML) INJECTION EVERY 12 HOURS SCHEDULED
Status: DISCONTINUED | OUTPATIENT
Start: 2021-05-21 | End: 2021-05-24 | Stop reason: HOSPADM

## 2021-05-21 RX ORDER — ROCURONIUM BROMIDE 10 MG/ML
INJECTION, SOLUTION INTRAVENOUS PRN
Status: DISCONTINUED | OUTPATIENT
Start: 2021-05-21 | End: 2021-05-21 | Stop reason: SDUPTHER

## 2021-05-21 RX ORDER — PROPOFOL 10 MG/ML
INJECTION, EMULSION INTRAVENOUS PRN
Status: DISCONTINUED | OUTPATIENT
Start: 2021-05-21 | End: 2021-05-21 | Stop reason: SDUPTHER

## 2021-05-21 RX ORDER — LIDOCAINE HYDROCHLORIDE 20 MG/ML
INJECTION, SOLUTION INFILTRATION; PERINEURAL PRN
Status: DISCONTINUED | OUTPATIENT
Start: 2021-05-21 | End: 2021-05-21 | Stop reason: SDUPTHER

## 2021-05-21 RX ORDER — DEXAMETHASONE SODIUM PHOSPHATE 4 MG/ML
INJECTION, SOLUTION INTRA-ARTICULAR; INTRALESIONAL; INTRAMUSCULAR; INTRAVENOUS; SOFT TISSUE PRN
Status: DISCONTINUED | OUTPATIENT
Start: 2021-05-21 | End: 2021-05-21 | Stop reason: SDUPTHER

## 2021-05-21 RX ORDER — OXYCODONE HYDROCHLORIDE 5 MG/1
5 TABLET ORAL EVERY 4 HOURS PRN
Status: DISCONTINUED | OUTPATIENT
Start: 2021-05-21 | End: 2021-05-24 | Stop reason: HOSPADM

## 2021-05-21 RX ORDER — ONDANSETRON 2 MG/ML
INJECTION INTRAMUSCULAR; INTRAVENOUS PRN
Status: DISCONTINUED | OUTPATIENT
Start: 2021-05-21 | End: 2021-05-21 | Stop reason: SDUPTHER

## 2021-05-21 RX ORDER — LIDOCAINE HYDROCHLORIDE 20 MG/ML
INJECTION, SOLUTION EPIDURAL; INFILTRATION; INTRACAUDAL; PERINEURAL PRN
Status: DISCONTINUED | OUTPATIENT
Start: 2021-05-21 | End: 2021-05-21 | Stop reason: SDUPTHER

## 2021-05-21 RX ORDER — MIDAZOLAM HYDROCHLORIDE 1 MG/ML
INJECTION INTRAMUSCULAR; INTRAVENOUS PRN
Status: DISCONTINUED | OUTPATIENT
Start: 2021-05-21 | End: 2021-05-21 | Stop reason: SDUPTHER

## 2021-05-21 RX ORDER — SODIUM CHLORIDE 9 MG/ML
25 INJECTION, SOLUTION INTRAVENOUS PRN
Status: DISCONTINUED | OUTPATIENT
Start: 2021-05-21 | End: 2021-05-24 | Stop reason: HOSPADM

## 2021-05-21 RX ORDER — PROMETHAZINE HYDROCHLORIDE 25 MG/1
12.5 TABLET ORAL EVERY 6 HOURS PRN
Status: DISCONTINUED | OUTPATIENT
Start: 2021-05-21 | End: 2021-05-24 | Stop reason: HOSPADM

## 2021-05-21 RX ADMIN — DEXAMETHASONE SODIUM PHOSPHATE 10 MG: 4 INJECTION, SOLUTION INTRAMUSCULAR; INTRAVENOUS at 11:08

## 2021-05-21 RX ADMIN — SODIUM CHLORIDE: 9 INJECTION, SOLUTION INTRAVENOUS at 10:54

## 2021-05-21 RX ADMIN — MORPHINE SULFATE 2 MG: 2 INJECTION, SOLUTION INTRAMUSCULAR; INTRAVENOUS at 04:42

## 2021-05-21 RX ADMIN — WATER 2000 MG: 1 INJECTION INTRAMUSCULAR; INTRAVENOUS; SUBCUTANEOUS at 14:30

## 2021-05-21 RX ADMIN — PROPOFOL 80 MG: 10 INJECTION, EMULSION INTRAVENOUS at 10:13

## 2021-05-21 RX ADMIN — ONDANSETRON 4 MG: 2 INJECTION INTRAMUSCULAR; INTRAVENOUS at 11:08

## 2021-05-21 RX ADMIN — PROPOFOL 70 MG: 10 INJECTION, EMULSION INTRAVENOUS at 10:10

## 2021-05-21 RX ADMIN — PROPOFOL 50 MG: 10 INJECTION, EMULSION INTRAVENOUS at 10:16

## 2021-05-21 RX ADMIN — LIDOCAINE HYDROCHLORIDE 40 MG: 20 INJECTION, SOLUTION INFILTRATION; PERINEURAL at 10:10

## 2021-05-21 RX ADMIN — Medication 10 ML: at 07:55

## 2021-05-21 RX ADMIN — FENTANYL CITRATE 50 MCG: 50 INJECTION, SOLUTION INTRAMUSCULAR; INTRAVENOUS at 11:23

## 2021-05-21 RX ADMIN — PROPOFOL 150 MG: 10 INJECTION, EMULSION INTRAVENOUS at 10:58

## 2021-05-21 RX ADMIN — ROCURONIUM BROMIDE 20 MG: 10 INJECTION, SOLUTION INTRAVENOUS at 10:58

## 2021-05-21 RX ADMIN — FENTANYL CITRATE 50 MCG: 50 INJECTION, SOLUTION INTRAMUSCULAR; INTRAVENOUS at 10:54

## 2021-05-21 RX ADMIN — OXYCODONE AND ACETAMINOPHEN 1 TABLET: 10; 325 TABLET ORAL at 20:58

## 2021-05-21 RX ADMIN — LIDOCAINE HYDROCHLORIDE 100 MG: 20 INJECTION, SOLUTION EPIDURAL; INFILTRATION; INTRACAUDAL; PERINEURAL at 10:58

## 2021-05-21 RX ADMIN — SODIUM CHLORIDE: 9 INJECTION, SOLUTION INTRAVENOUS at 10:00

## 2021-05-21 RX ADMIN — MIDAZOLAM 2 MG: 1 INJECTION INTRAMUSCULAR; INTRAVENOUS at 10:54

## 2021-05-21 ASSESSMENT — PULMONARY FUNCTION TESTS
PIF_VALUE: 16
PIF_VALUE: 20
PIF_VALUE: 19
PIF_VALUE: 0
PIF_VALUE: 10
PIF_VALUE: 18
PIF_VALUE: 19
PIF_VALUE: 0
PIF_VALUE: 10
PIF_VALUE: 20
PIF_VALUE: 19
PIF_VALUE: 20
PIF_VALUE: 23
PIF_VALUE: 21
PIF_VALUE: 20
PIF_VALUE: 11
PIF_VALUE: 20
PIF_VALUE: 10
PIF_VALUE: 20
PIF_VALUE: 10
PIF_VALUE: 10
PIF_VALUE: 20
PIF_VALUE: 19
PIF_VALUE: 11
PIF_VALUE: 22
PIF_VALUE: 19
PIF_VALUE: 21
PIF_VALUE: 21
PIF_VALUE: 1
PIF_VALUE: 19
PIF_VALUE: 20
PIF_VALUE: 19
PIF_VALUE: 20
PIF_VALUE: 7
PIF_VALUE: 10
PIF_VALUE: 17
PIF_VALUE: 20
PIF_VALUE: 20
PIF_VALUE: 19
PIF_VALUE: 19
PIF_VALUE: 20
PIF_VALUE: 21
PIF_VALUE: 0
PIF_VALUE: 19
PIF_VALUE: 23
PIF_VALUE: 10
PIF_VALUE: 19
PIF_VALUE: 10
PIF_VALUE: 17
PIF_VALUE: 18
PIF_VALUE: 1
PIF_VALUE: 19
PIF_VALUE: 20

## 2021-05-21 ASSESSMENT — PAIN DESCRIPTION - ORIENTATION
ORIENTATION: LEFT
ORIENTATION: RIGHT

## 2021-05-21 ASSESSMENT — PAIN - FUNCTIONAL ASSESSMENT
PAIN_FUNCTIONAL_ASSESSMENT: 0-10
PAIN_FUNCTIONAL_ASSESSMENT: PREVENTS OR INTERFERES WITH MANY ACTIVE NOT PASSIVE ACTIVITIES

## 2021-05-21 ASSESSMENT — PAIN DESCRIPTION - PAIN TYPE
TYPE: ACUTE PAIN
TYPE: ACUTE PAIN

## 2021-05-21 ASSESSMENT — ENCOUNTER SYMPTOMS
SHORTNESS OF BREATH: 0
SHORTNESS OF BREATH: 0

## 2021-05-21 ASSESSMENT — PAIN SCALES - GENERAL
PAINLEVEL_OUTOF10: 7
PAINLEVEL_OUTOF10: 7
PAINLEVEL_OUTOF10: 3
PAINLEVEL_OUTOF10: 0
PAINLEVEL_OUTOF10: 3

## 2021-05-21 ASSESSMENT — PAIN DESCRIPTION - DESCRIPTORS: DESCRIPTORS: ACHING;CONSTANT;SHARP

## 2021-05-21 ASSESSMENT — PAIN DESCRIPTION - LOCATION
LOCATION: SHOULDER

## 2021-05-21 ASSESSMENT — PAIN DESCRIPTION - PROGRESSION: CLINICAL_PROGRESSION: GRADUALLY WORSENING

## 2021-05-21 ASSESSMENT — PAIN DESCRIPTION - FREQUENCY: FREQUENCY: CONTINUOUS

## 2021-05-21 NOTE — PROGRESS NOTES
[address] [place] [address] [facility]  Progress Note    CC: not feeling well, + shoulder pain  Face to face encounter   SUBJECTIVE:    The patient went down to the OR.     Medications:  Scheduled Meds:   metoclopramide  5 mg Oral TID AC    senna  1 tablet Oral BID    lidocaine  10 mL Intra-articular Once    cefTRIAXone (ROCEPHIN) IV  2,000 mg Intravenous Q24H    pantoprazole  40 mg Oral QAM AC    sodium chloride flush  10 mL Intravenous 2 times per day    enoxaparin  40 mg Subcutaneous Daily    lidocaine  1 patch Transdermal Daily     Continuous Infusions:   sodium chloride       PRN Meds:HYDROmorphone, HYDROmorphone, ondansetron, bisacodyl, albuterol, sodium chloride flush, sodium chloride, polyethylene glycol, acetaminophen **OR** acetaminophen, ibuprofen, oxyCODONE-acetaminophen, oxyCODONE-acetaminophen, morphine  OBJECTIVE:  Patient Vitals for the past 24 hrs:   BP Temp Temp src Pulse Resp SpO2 Height Weight   05/21/21 1300 (!) 152/89 -- -- 84 22 95 % -- --   05/21/21 1245 137/80 -- -- 80 20 96 % -- --   05/21/21 1228 128/88 97.5 °F (36.4 °C) Temporal 84 12 99 % -- --   05/21/21 1025 133/84 97 °F (36.1 °C) Tympanic 91 18 100 % -- --   05/21/21 0942 (!) 154/84 98 °F (36.7 °C) Tympanic 76 19 100 % 5' 6\" (1.676 m) 160 lb (72.6 kg)   05/21/21 0755 139/74 98.1 °F (36.7 °C) Axillary 74 16 -- -- --   05/21/21 0430 139/78 -- -- 78 16 -- -- --   05/20/21 2130 136/83 98.1 °F (36.7 °C) Tympanic 75 16 99 % -- --     The patient has not been in her room all day    Laboratory and Tests Review:  Lab Results   Component Value Date    WBC 3.6 (L) 05/20/2021    WBC 5.7 05/18/2021    HGB 11.8 05/20/2021    HCT 33.7 (L) 05/20/2021    MCV 98.3 05/20/2021     05/20/2021     Lab Results   Component Value Date    NEUTROABS 5.35 05/18/2021     Lab Results   Component Value Date    CRP 27.5 (H) 05/19/2021     Lab Results   Component Value Date    SEDRATE 70 (H) 05/19/2021     Lab Results   Component Value Date    ALT 39 (H) 05/18/2021    AST 53 (H) 05/18/2021    ALKPHOS 55 05/18/2021    BILITOT 1.3 (H) 05/18/2021     Lab Results   Component Value Date     05/20/2021    K 3.3 05/20/2021    CL 94 05/20/2021    CO2 27 05/20/2021    BUN 8 05/20/2021    CREATININE 0.7 05/20/2021    GFRAA >60 05/20/2021    LABGLOM >60 05/20/2021    GLUCOSE 98 05/20/2021    PROT 7.7 05/18/2021    LABALBU 4.0 05/18/2021    CALCIUM 9.7 05/20/2021    BILITOT 1.3 05/18/2021    ALKPHOS 55 05/18/2021    AST 53 05/18/2021    ALT 39 05/18/2021     Radiology:  Reviewed     Microbiology:   5/18/2021-blood culture- Gram positive cocci in chains - strep pneumoniae   5/19/2021- blood cx- gram positive cocci in chains   Strep pneumoniae antigen- pending     Respiratory panel- ++ enterovirus/ Rhinovirus     ASSESSMENT:  · High grade Strep Pneumoniae Bacteremia. She had a HUSSEIN today. No mention of vegetations  · Right septic shoulder arthritis. Status post arthroscopic incision and drainage right shoulder with debridement of subacromial bursa.   · Right pelvic pain  · Leukopenia   · Rhinovirus/ enterovirus   · History of breast cancer     PLAN:  · Continue Ceftriaxone   · Check cultures  · Oncology following - for bone scan   · Repeat blood cultures in am  · Ortho consulted to evaluate right shoulder   · Monitor labs- sed rate- 70  crp-27.5    procal- 2.66     Spoke with mother    Torres Moody MD  1:09 PM  5/21/2021

## 2021-05-21 NOTE — ANESTHESIA PRE PROCEDURE
in sterile water 20 mL IV syringe  2,000 mg Intravenous Q24H Sharita Stapleton MD   2,000 mg at 05/20/21 1540    pantoprazole (PROTONIX) tablet 40 mg  40 mg Oral QAM AC Katina Meyers MD   40 mg at 05/20/21 0954    albuterol (PROVENTIL) nebulizer solution 2.5 mg  2.5 mg Nebulization Q6H PRN Katina Meyers MD        sodium chloride flush 0.9 % injection 10 mL  10 mL Intravenous 2 times per day Veronica Swann MD   10 mL at 05/21/21 0755    sodium chloride flush 0.9 % injection 10 mL  10 mL Intravenous PRN Katina Meyers MD        0.9 % sodium chloride infusion  25 mL Intravenous PRN Katina Meyers MD        enoxaparin (LOVENOX) injection 40 mg  40 mg Subcutaneous Daily Katina Meyers MD   40 mg at 05/20/21 0954    polyethylene glycol (GLYCOLAX) packet 17 g  17 g Oral Daily PRN Veronica Swann MD   17 g at 05/20/21 0218    acetaminophen (TYLENOL) tablet 650 mg  650 mg Oral Q6H PRN Katina Meyers MD   650 mg at 05/20/21 1702    Or    acetaminophen (TYLENOL) suppository 650 mg  650 mg Rectal Q6H PRN Katina Meyers MD        ibuprofen (ADVIL;MOTRIN) tablet 400 mg  400 mg Oral Q6H PRN Katina Meyers MD        oxyCODONE-acetaminophen (PERCOCET)  MG per tablet 1 tablet  1 tablet Oral Q4H PRN Veronica Swann MD   1 tablet at 05/20/21 2139    oxyCODONE-acetaminophen (PERCOCET) 5-325 MG per tablet 1 tablet  1 tablet Oral Q4H PRN Veronica Swann MD   1 tablet at 05/19/21 1445    morphine (PF) injection 2 mg  2 mg Intravenous Q4H PRN Katina Meyers MD   2 mg at 05/21/21 0442    lidocaine 4 % external patch 1 patch  1 patch Transdermal Daily Katina Meyers MD   1 patch at 05/20/21 0900       Allergies:     Allergies   Allergen Reactions    Adhesive Tape      Other reaction(s): Dermatitis    Clarithromycin Nausea Only     Other reaction(s): severe gas, heartburn    Codeine Nausea Only     Other reaction(s): GI Intolerance, upset stomach       Problem List:    Patient Active Problem List   Diagnosis Code    Uncontrolled pain R52    Intractable back pain M54.9       Past Medical History:        Diagnosis Date    Asthma     Cancer ([address]     Kidney stones        Past Surgical History:        Procedure Laterality Date    BREAST SURGERY      L lumpectomy        Social History:    Social History     Tobacco Use    Smoking status: Former Smoker     Packs/day: 0.25     Types: Cigarettes     Start date: 1975     Quit date: 1977     Years since quittin.0    Smokeless tobacco: Never Used    Tobacco comment: social smoker   Substance Use Topics    Alcohol use: Not Currently                                Counseling given: Not Answered  Comment: social smoker      Vital Signs (Current):   Vitals:    21 2130 21 0430 21 0755 21 0942   BP: 136/83 139/78 139/74 (!) 154/84   Pulse: 75 78 74 76   Resp:    Temp: 98.1 °F (36.7 °C)  98.1 °F (36.7 °C) 98 °F (36.7 °C)   TempSrc: Tympanic  Axillary Tympanic   SpO2: 99%   100%   Weight:    160 lb (72.6 kg)   Height:    5' 6\" (1.676 m)                                              BP Readings from Last 3 Encounters:   21 (!) 154/84       NPO Status: Time of last liquid consumption:                         Time of last solid consumption:                         Date of last liquid consumption: 21                        Date of last solid food consumption: 21    BMI:   Wt Readings from Last 3 Encounters:   21 160 lb (72.6 kg)     Body mass index is 25.82 kg/m².     CBC:   Lab Results   Component Value Date    WBC 3.6 2021    RBC 3.43 2021    HGB 11.8 2021    HCT 33.7 2021    MCV 98.3 2021    RDW 14.4 2021     2021       CMP:   Lab Results   Component Value Date     2021    K 3.3 2021    CL 94 2021    CO2 27 2021    BUN 8 2021    CREATININE 0.7 2021    GFRAA >60 2021    LABGLOM >60 2021    GLUCOSE 98 05/20/2021    PROT 7.7 05/18/2021    CALCIUM 9.7 05/20/2021    BILITOT 1.3 05/18/2021    ALKPHOS 55 05/18/2021    AST 53 05/18/2021    ALT 39 05/18/2021       POC Tests: No results for input(s): POCGLU, POCNA, POCK, POCCL, POCBUN, POCHEMO, POCHCT in the last 72 hours. Coags: No results found for: PROTIME, INR, APTT    HCG (If Applicable): No results found for: PREGTESTUR, PREGSERUM, HCG, HCGQUANT     ABGs: No results found for: PHART, PO2ART, LBR9RQI, EUJ4RKR, BEART, [id number]     Type & Screen (If Applicable):  No results found for: LABABO, LABRH    Drug/Infectious Status (If Applicable):  No results found for: HIV, HEPCAB    COVID-19 Screening (If Applicable):   Lab Results   Component Value Date    COVID19 Not Detected 05/18/2021           Anesthesia Evaluation  Patient summary reviewed  Airway: Mallampati: II  TM distance: >3 FB   Neck ROM: full  Mouth opening: > = 3 FB Dental: normal exam         Pulmonary: breath sounds clear to auscultation  (+) asthma:     (-) shortness of breath                          ROS comment: Former smoker   Cardiovascular:        (-) past MI and CAD      Rhythm: regular  Rate: normal  Echocardiogram reviewed                  Neuro/Psych:   Negative Neuro/Psych ROS              GI/Hepatic/Renal:        (-) no renal disease       Endo/Other:        (-) diabetes mellitus               Abdominal:         (-) obese     Vascular: negative vascular ROS. Anesthesia Plan      general     ASA 3       Induction: intravenous. BIS  MIPS: Postoperative opioids intended and Prophylactic antiemetics administered. Anesthetic plan and risks discussed with patient. Plan discussed with CRNA.                   Jones Conteh MD   5/21/2021

## 2021-05-21 NOTE — PROGRESS NOTES
[facility]   [facility] Note    Admitting Date and Time: 5/18/2021  2:59 PM  Admit Dx: Uncontrolled pain [R52]    Subjective: Admitted on 18th, came with back pain, patient with stage I left invasive ductal carcinoma, s/p lumpectomy and XRT, on Arimidex, also has MGUS, did have chills, tobacco use in past, lucencies noted on CT pelvis, patient with Streptococcus bacteremia, issues with urinary retention, seen by urology, creatinine stable, no evidence of obstructive uropathy, De Jesus catheter continued, voiding trials when patient ambulatory, no further  interventions planned, positive for rhinovirus, seen by oncology, lucencies in pelvic bones are nonspecific, patient with stable recent monoclonal protein levels. Seen by ID, high-grade strep pneumoniae bacteremia, suspected right shoulder septic arthritis, blood cultures repeated, orthopedic surgery on consult for right shoulder arthrocentesis. Seen by orthopedics, attempt to aspirate subacromial space not successful. Effusion seems to be isolated to the glenohumeral joint. Patient is planned n.p.o. after midnight and surgery tomorrow morning. As per urology patient will need follow-up with her urologist in [facility] once she returns there. Patient had HUSSEIN, normal LV as well as LV function. No vegetations or valve disease. No thrombus. No intra atrial shunting. Patient did undergo I&D and debridement of right shoulder subacromial bursa, evidence of inflammatory type synovitis involving right shoulder capsule. ID has continued ceftriaxone. Patient was admitted with Uncontrolled pain [R52]. Patient feels comfortable, at this time awake, alert, resting in bed, does communicate well, did have multiple questions pertaining to what was done to the heart as well as in the OR, they were answered. Denies any other needs. Per RN: Still results pending from the contents of the joint.     ROS: denies fever, chills, cp, sob, n/v, HA unless stated above.      metoclopramide  5 mg Oral TID AC    senna  1 tablet Oral BID    lidocaine  10 mL Intra-articular Once    cefTRIAXone (ROCEPHIN) IV  2,000 mg Intravenous Q24H    pantoprazole  40 mg Oral QAM AC    sodium chloride flush  10 mL Intravenous 2 times per day    enoxaparin  40 mg Subcutaneous Daily    lidocaine  1 patch Transdermal Daily     bisacodyl, 10 mg, Daily PRN  albuterol, 2.5 mg, Q6H PRN  sodium chloride flush, 10 mL, PRN  sodium chloride, 25 mL, PRN  polyethylene glycol, 17 g, Daily PRN  acetaminophen, 650 mg, Q6H PRN   Or  acetaminophen, 650 mg, Q6H PRN  ibuprofen, 400 mg, Q6H PRN  oxyCODONE-acetaminophen, 1 tablet, Q4H PRN  oxyCODONE-acetaminophen, 1 tablet, Q4H PRN  morphine, 2 mg, Q4H PRN         Objective:    /78   Pulse 78   Temp 98.1 °F (36.7 °C) (Tympanic)   Resp 16   Ht 5' 6\" (1.676 m)   Wt 160 lb (72.6 kg)   SpO2 99%   BMI 25.82 kg/m²   General Appearance: alert and oriented to person, place and time, well-developed and well-nourished, in no acute distress  Skin: warm and dry, no rash or erythema  Head: normocephalic and atraumatic  Eyes: pupils equal, round, and reactive to light, extraocular eye movements intact, conjunctivae normal  ENT: tympanic membrane, external ear and ear canal normal bilaterally, oropharynx clear and moist with normal mucous membranes  Neck: neck supple and non tender without mass, no thyromegaly or thyroid nodules, no cervical lymphadenopathy   Pulmonary/Chest: clear to auscultation bilaterally- no wheezes, rales or rhonchi, normal air movement, no respiratory distress  Cardiovascular: normal rate, normal S1 and S2, no gallops, intact distal pulses and no carotid bruits  Abdomen: soft, non-tender, non-distended, normal bowel sounds, no masses or organomegaly      Recent Labs     05/18/21  1543 05/20/21  0910    132   K 3.4* 3.3*    94*   CO2 22 27   BUN 12 8   CREATININE 0.7 0.7   GLUCOSE 141* 98   CALCIUM 9.5 9.7 Recent Labs     05/18/21  1543 05/20/21  0910   WBC 5.7 3.6*   RBC 3.48* 3.43*   HGB 11.6 11.8   HCT 35.2 33.7*   .1* 98.3   MCH 33.3 34.4   MCHC 33.0 35.0*   RDW 14.8 14.4    166   MPV 10.8 10.0     CRP 27.5  ESR 70  CT right shoulder with synovial effusion, no erosive changes suggestive of osteomyelitis. No soft tissue mass or evidence of abscess. Echo with normal LV, no wall motion abnormality, normal EF, normal diastolic function. K3.3  IgA 27/total IgG 1907/IgM 18  Bone scan mostly showing DJD    Radiology:   NM BONE SCAN WHOLE BODY   Final Result   While there is subtle increased activity along the right sacroiliac joint, no   significant activity is otherwise seen to correspond to the multiple   bilateral lucent pelvic lesions on CT. This does not exclude malignancy as   certain malignancies such as multiple myeloma may be occult on bone scan. Correlation with SPEP and UPEP could be considered. Multifocal degenerative changes are otherwise favored as outlined above. CT SHOULDER RIGHT W WO CONTRAST   Final Result   1. Moderate right shoulder synovial effusion. 2.  No bony erosive changes to suggest osteomyelitis. 3.  No soft tissue mass or evidence of abscess. CT ABDOMEN PELVIS WO CONTRAST Additional Contrast? None   Final Result   1. No acute abnormality is seen in the abdomen or the pelvis. 2. No urolithiasis or hydronephrosis   3. 1.5 probable complex cyst in the right kidney. Sonographic evaluation   recommended. 4. Subtle lucencies in the pelvic bones are nonspecific and of questionable   significance. Possibility metastatic disease or multiple myeloma should be   considered. Clinical correlation is needed. Assessment:    Active Problems:    Uncontrolled pain    Intractable back pain  Resolved Problems:    * No resolved hospital problems. *      Plan:  1.  Constipation, patient had a suppository, could be related to the use of pain medications, will add MiraLAX as well as senna. Also Reglan in low doses. 2. Strep bacteremia, elevated procalcitonin, increased CRP, patient on ceftriaxone day 3, ID on board, final sensitivity pending. 3. Rhinovirus infection, patient remains on supportive care as well as isolation, no change. 4. Patient with known ductal carcinoma of breast, also MUGS, radiolucencies on pelvic CT-due as per oncology nonspecific, bone scan negative. 5. Suspected right shoulder septic arthritis, patient did undergo incision and drainage after negative HUSSEIN. 6. Electrolyte imbalance, hypokalemia, supplement. 7. DVT prophylaxis with Lovenox.         Electronically signed by Gonsalo Grey MD on 5/21/2021 at 7:54 AM

## 2021-05-21 NOTE — PROGRESS NOTES
Physical Therapy    Facility/Department: [facility] OR     NAME: Madison Avendaño  : 1960  MRN: [id number]    Date of Service: 2021    PT eval was attempted and pt was out of her room in OR. Will re-attempt PT another time. Nadia Jackson., P.T.   License Number: [id number]

## 2021-05-21 NOTE — ANESTHESIA PRE PROCEDURE
Department of Anesthesiology  Preprocedure Note       Name:  Laurence Aguila   Age:  63 y.o.  :  1960                                          MRN:  [id number]         Date:  2021      Surgeon: Corrie Moyer    Procedure: HUSSEIN    Medications prior to admission:   Prior to Admission medications    Medication Sig Start Date End Date Taking? Authorizing Provider   omeprazole (PRILOSEC) 10 MG delayed release capsule Take 20 mg by mouth every other day     Historical Provider, MD   fluticasone-vilanterol (BREO ELLIPTA) 100-25 MCG/INH AEPB inhaler Inhale 1 puff into the lungs daily    Historical Provider, MD   albuterol sulfate HFA (PROAIR HFA) 108 (90 Base) MCG/ACT inhaler Inhale 2 puffs into the lungs every 6 hours as needed for Wheezing    Historical Provider, MD   fluticasone (FLONASE) 50 MCG/ACT nasal spray 1 spray by Each Nostril route daily as needed for Rhinitis    Historical Provider, MD   loratadine (CLARITIN) 10 MG capsule Take 20 mg by mouth daily    Historical Provider, MD   vitamin D 25 MCG (1000 UT) CAPS Take 1,000 Units by mouth daily    Historical Provider, MD   ALPRAZolam (XANAX) 0.25 MG tablet Take 0.25 mg by mouth 3 times daily as needed for Sleep. Historical Provider, MD       Current medications:    No current facility-administered medications for this visit. No current outpatient medications on file.      Facility-Administered Medications Ordered in Other Visits   Medication Dose Route Frequency Provider Last Rate Last Admin    HYDROmorphone (DILAUDID) injection 0.25 mg  0.25 mg Intravenous Q5 Min PRN Arturo Patel MD        HYDROmorphone (DILAUDID) injection 0.5 mg  0.5 mg Intravenous Q5 Min PRN Arturo Patel MD        ondansetron [facility]) injection 4 mg  4 mg Intravenous Once PRN Arturo Patel MD        bisacodyl (DULCOLAX) suppository 10 mg  10 mg Rectal Daily PRN Cary Duane, MD   10 mg at 21 0952    metoclopramide (REGLAN) tablet 5 mg  5 mg Oral Allergen Reactions    Adhesive Tape      Other reaction(s): Dermatitis    Clarithromycin Nausea Only     Other reaction(s): severe gas, heartburn    Codeine Nausea Only     Other reaction(s): GI Intolerance, upset stomach       Problem List:    Patient Active Problem List   Diagnosis Code    Uncontrolled pain R52    Intractable back pain M54.9       Past Medical History:        Diagnosis Date    Asthma     [address] [address]     Kidney stones        Past Surgical History:        Procedure Laterality Date    BREAST SURGERY      L lumpectomy        Social History:    Social History     Tobacco Use    Smoking status: Former Smoker     Packs/day: 0.25     Types: Cigarettes     Start date: 1975     Quit date: 1977     Years since quittin.0    Smokeless tobacco: Never Used    Tobacco comment: social smoker   Substance Use Topics    Alcohol use: Not Currently                                Counseling given: Not Answered  Comment: social smoker      Vital Signs (Current): There were no vitals filed for this visit.                                            BP Readings from Last 3 Encounters:   21 (!) 154/84       NPO Status:                                                                                 BMI:   Wt Readings from Last 3 Encounters:   21 160 lb (72.6 kg)     There is no height or weight on file to calculate BMI.    CBC:   Lab Results   Component Value Date    WBC 3.6 2021    RBC 3.43 2021    HGB 11.8 2021    HCT 33.7 2021    MCV 98.3 2021    RDW 14.4 2021     2021       CMP:   Lab Results   Component Value Date     2021    K 3.3 2021    CL 94 2021    CO2 27 2021    BUN 8 2021    CREATININE 0.7 2021    GFRAA >60 2021    LABGLOM >60 2021    GLUCOSE 98 2021    PROT 7.7 2021    CALCIUM 9.7 2021    BILITOT 1.3 2021    ALKPHOS 55 2021    AST 53 05/18/2021    ALT 39 05/18/2021       POC Tests: No results for input(s): POCGLU, POCNA, POCK, POCCL, POCBUN, POCHEMO, POCHCT in the last 72 hours. Coags: No results found for: PROTIME, INR, APTT    HCG (If Applicable): No results found for: PREGTESTUR, PREGSERUM, HCG, HCGQUANT     ABGs: No results found for: PHART, PO2ART, [id number], WOM5JUN, BEART, [id number]     Type & Screen (If Applicable):  No results found for: LABABO, LABRH    Drug/Infectious Status (If Applicable):  No results found for: HIV, HEPCAB    COVID-19 Screening (If Applicable):   Lab Results   Component Value Date    COVID19 Not Detected 05/18/2021           Anesthesia Evaluation  Patient summary reviewed  Airway: Mallampati: II  TM distance: >3 FB   Neck ROM: full  Mouth opening: > = 3 FB Dental: normal exam         Pulmonary: breath sounds clear to auscultation  (+) asthma:     (-) shortness of breath                          ROS comment: Former smoker   Cardiovascular:        (-) past MI and CAD      Rhythm: regular  Rate: normal  Echocardiogram reviewed                  Neuro/Psych:   Negative Neuro/Psych ROS              GI/Hepatic/Renal:        (-) no renal disease       Endo/Other:        (-) diabetes mellitus               Abdominal:         (-) obese     Vascular: negative vascular ROS. Anesthesia Plan      general     ASA 3       Induction: intravenous. BIS  MIPS: Postoperative opioids intended and Prophylactic antiemetics administered. Anesthetic plan and risks discussed with patient. Plan discussed with CRNA.                   Christophe Arreola MD   5/21/2021

## 2021-05-21 NOTE — ANESTHESIA POSTPROCEDURE EVALUATION
Department of Anesthesiology  Postprocedure Note    Patient: Madison Avendaño  MRN: [id number]  YOB: 1960  Date of evaluation: 5/21/2021  Time:  4:53 PM     Procedure Summary     Date: 05/21/21 Room / Location: [facility] Echocardiography    Anesthesia Start: 1001 Anesthesia Stop: 1021    Procedure: TRANSESOPHAGEAL ECHO Diagnosis:     Scheduled Providers:  Responsible Provider: Julius Crowell MD    Anesthesia Type: general ASA Status: 3          Anesthesia Type: general    Shawn Phase I: Shawn Score: 8    Shawn Phase II: Shawn Score: 10    Last vitals: Reviewed and per EMR flowsheets.        Anesthesia Post Evaluation    Patient location during evaluation: PACU  Patient participation: complete - patient participated  Level of consciousness: awake and alert  Airway patency: patent  Nausea & Vomiting: no vomiting and no nausea  Complications: no  Cardiovascular status: hemodynamically stable  Respiratory status: acceptable  Hydration status: stable

## 2021-05-21 NOTE — OP NOTE
had been undergoing IV antibiotics per infectious disease. The patient was then positioned in a lateral decubitus position and the correct operative upper extremities and prepped and draped in a sterile orthopedic fashion. A timeout was performed in which the patient, operative extremity as well as the procedure were confirmed by all parties present. I used an 18-gauge needle to enter into the glenohumeral joint from a standard posterior arthroscopic portal site and the glenohumeral joint was insufflated with approximately 20 cc of normal saline. I then used a #11 scalpel blade to create a standard posterior arthroscopic portal and the arthroscope was introduced into the glenohumeral joint. Prior to placing the arthroscope through the metal cannula, I remove the introducer but did not appreciate any immediate return of any joint fluid or effusion. Once the arthroscope was introduced into the glenohumeral joint, the joint was further insufflated normal saline. A diagnostic arthroscopy was then performed. There was significant synovitis of the joint capsule with prominent synovitis within the rotator interval.  Several clots were noted within the joint but there was no pus identified. There is no obvious evidence of infection. The cartilage surfaces of the proximal humerus is well as the glenoid or arthritic but there is no evidence of erosive osteomyelitis or infection. There was a fairly prominent, grade 3 lesion about the central aspect of the glenoid. Diffuse labral fraying was noted circumferentially about the glenoid. Within the rotator interval, as mentioned, there was adherent capsular inflammation. Subscapularis tendon was intact. A posterior lever test was performed and the tendon appeared to be firmly attached to the lesser tuberosity. Identified the biceps tendon and this was also found to be intact and without any significant lesion. The biceps was firmly attached to the glenoid labrum. I visualize the undersurface of the supraspinatus footprint. There was significant fraying of the articular portion of the tendon attachment. No full-thickness tearing was identified intra-articularly while looking at the undersurface of the cuff. A standard anterior arthroscopic portal site was then created under direct visualization using a spinal needle. The arthroscopic shaver was introduced through the anterior portal and I used this to debride the inflamed, frayed labrum as well as debride the inflamed capsular tissue. Bleeding vessels were coagulated using the ablation wand. A total of 9 L of saline was run through the joint and use during the debridement/washout of the glenohumeral joint. At this point, the scope was removed from the posterior portal.  The metal cannula was reintroduced to the posterior portal but into the subacromial space. Subacromial space was insufflated normal saline. Identify the rotator cuff and there was a full-thickness tear involving the mid substance of the supraspinatus, just medial to its insertion about the greater tuberosity. The tear was atypical, U-shaped type tear with extension posteriorly to the level of the infraspinatus tendon. There was no significant retraction of the tear and it appeared to be more degenerative than anything. A standard lateral portal was created under direct visualization and the shaver was then introduced. I noted that there was significant inflammatory type bursal tissue but again, there was no evidence of purulent infection. The shaver was used to debride subacromial space and remove any inflammatory bursal tissue. Minimal cuff debridement was performed, only to be able to identify the undersurface cuff tissue beneath the bursa. I did collect a soft tissue sample for culture by attaching a  to the back of the suction from the arthroscopic shaver. This culture will be sent for further analysis.   As the cuff was torn in a full-thickness type manner, I reasoned that the subacromial space was effectively indirect medication with the glenohumeral joint and so any soft tissue culture of the subacromial space should be sufficient for determining whether or not there was an intra-articular infection. Once all debris was removed from the subacromial space, the suction was used to drain the remaining amount of saline. All instrumentation was then removed. The portal sites were closed using 3-0 nylon suture in an interrupted fashion. A sterile dressing was placed and the operative upper extremity was immobilized into a sling. Anesthesia was reversed and patient was taken recovery in stable condition. There are no apparent complications.     Electronically signed by Abel Zarate DO on 5/21/2021 at 12:21 PM

## 2021-05-21 NOTE — CARE COORDINATION
Social Work:    Patient is undergoing an I & D of right shoulder today. Social work called a possible referral in to Reagan Vazquezing at [employer] to check benefits should IVATB be required. Patient resides in [facility] and is staying with family locally. If HHC or IVATB is required, ID or surgeon will need to follow vs possible O.P. infusion if ID once a day.     Electronically signed by GREG Sylvester on 5/21/2021 at 1:00 PM

## 2021-05-21 NOTE — PROCEDURES
Kristina Leyva is a 63 y.o. female patient. 1. Intractable back pain    2. Renal cyst      Past Medical History:   Diagnosis Date    Asthma     Cancer ([address]     Kidney stones      Blood pressure (!) 154/84, pulse 76, temperature 98 °F (36.7 °C), temperature source Tympanic, resp. rate 19, height 5' 6\" (1.676 m), weight 160 lb (72.6 kg), SpO2 100 %. Procedures PRELIMINARY TRANSESOPHAGEAL ECHOCARDIOGRAPHY REPORT    Indications for study: Bacteremia    Study performed using (Sedation): Per anesthesia    Complications: None    Preliminary findings: Normal LV function, normal RV function, no hemodynamically significant valve disease, no evidence of vegetations, no left atrial or left atrial appendage thrombus (no evidence of spontaneous echo contrast), no intraatrial shunting on bubble study, no significant atherosclerosis of the aorta. For patient status during procedure, refer to intra-procedure sedation flowsheet. The complete HUSSEIN report will follow - see \"CARDIOLOGY\" Section in [address] [place] [address] Amandeep Correa.  [facility] [place], [address] [place] [address] Cardiology            Suman Gross MD  5/21/2021

## 2021-05-21 NOTE — PROGRESS NOTES
Occupational Therapy  OT order received. Pt off floor at procedure. Will continue to follow. Thank you.     Annika Valles OTR/L  [id number]

## 2021-05-21 NOTE — CARE COORDINATION
Social Work:    Raffi at [employer] advised that Maureen Sandifer has $1600 deductible/$902 met thus far, then 80% coverage with $5,600 out of pocket required/$822 met thus far. Present IV will cost $72.32 per day until deductible & out of pocket are met, which would bring price down to $14.67 per day. Piedad Rey to continue to follow for possible IVATB need if required and patient agreeable.     Electronically signed by GREG Handy on 5/21/2021 at 2:57 PM

## 2021-05-21 NOTE — ANESTHESIA POSTPROCEDURE EVALUATION
Department of Anesthesiology  Postprocedure Note    Patient: Alexis Valdes  MRN: [id number]  YOB: 1960  Date of evaluation: 5/21/2021  Time:  1:52 PM     Procedure Summary     Date: 05/21/21 Room / Location: [facility] OR 03 / [facility]    Anesthesia Start: 1054 Anesthesia Stop: 6046    Procedure: ARTHROSCOPIC INCISION AND DRAINAGE RIGHT SHOULDER WITH DEBRIDEMENT OF SUBACROMIAL BURSA (Right ) Diagnosis: (/)    Surgeons: Mellissa Blanco DO Responsible Provider: Temo Bowman MD    Anesthesia Type: general ASA Status: 3          Anesthesia Type: general    Shawn Phase I: Shawn Score: 8    Shawn Phase II: Shawn Score: 10    Last vitals: Reviewed and per EMR flowsheets.        Anesthesia Post Evaluation    Patient location during evaluation: PACU  Patient participation: complete - patient participated  Level of consciousness: awake and alert  Airway patency: patent  Nausea & Vomiting: no vomiting and no nausea  Complications: no  Cardiovascular status: hemodynamically stable  Respiratory status: acceptable  Hydration status: stable

## 2021-05-22 LAB
ANION GAP SERPL CALCULATED.3IONS-SCNC: 11 MMOL/L (ref 7–16)
BUN BLDV-MCNC: 12 MG/DL (ref 6–23)
CA 15-3: 17 U/ML (ref 0–31)
CA 27.29: 21.4 U/ML (ref 0–40)
CALCIUM SERPL-MCNC: 9.8 MG/DL (ref 8.6–10.2)
CHLORIDE BLD-SCNC: 100 MMOL/L (ref 98–107)
CO2: 26 MMOL/L (ref 22–29)
CREAT SERPL-MCNC: 0.6 MG/DL (ref 0.5–1)
GFR AFRICAN AMERICAN: >60
GFR NON-AFRICAN AMERICAN: >60 ML/MIN/1.73
GLUCOSE BLD-MCNC: 113 MG/DL (ref 74–99)
GRAM STAIN ORDERABLE: NORMAL
KAPPA FREE LIGHT CHAINS QNT: 301.99 MG/L (ref 3.3–19.4)
KAPPA/LAMBDA FREE LIGHT CHAIN RATIO: 39.17 (ref 0.26–1.65)
LAMBDA FREE LIGHT CHAINS QNT: 7.71 MG/L (ref 5.71–26.3)
MRSA CULTURE ONLY: NORMAL
POTASSIUM SERPL-SCNC: 3.7 MMOL/L (ref 3.5–5)
SODIUM BLD-SCNC: 137 MMOL/L (ref 132–146)

## 2021-05-22 PROCEDURE — 6370000000 HC RX 637 (ALT 250 FOR IP): Performed by: INTERNAL MEDICINE

## 2021-05-22 PROCEDURE — 80048 BASIC METABOLIC PNL TOTAL CA: CPT

## 2021-05-22 PROCEDURE — 2580000003 HC RX 258: Performed by: GENERAL ACUTE CARE HOSPITAL

## 2021-05-22 PROCEDURE — 99232 SBSQ HOSP IP/OBS MODERATE 35: CPT | Performed by: INTERNAL MEDICINE

## 2021-05-22 PROCEDURE — 02HV33Z INSERTION OF INFUSION DEVICE INTO SUPERIOR VENA CAVA, PERCUTANEOUS APPROACH: ICD-10-PCS | Performed by: GENERAL ACUTE CARE HOSPITAL

## 2021-05-22 PROCEDURE — 36569 INSJ PICC 5 YR+ W/O IMAGING: CPT

## 2021-05-22 PROCEDURE — 36415 COLL VENOUS BLD VENIPUNCTURE: CPT

## 2021-05-22 PROCEDURE — 6360000002 HC RX W HCPCS: Performed by: SPECIALIST

## 2021-05-22 PROCEDURE — 76937 US GUIDE VASCULAR ACCESS: CPT

## 2021-05-22 PROCEDURE — C1751 CATH, INF, PER/CENT/MIDLINE: HCPCS

## 2021-05-22 PROCEDURE — 6360000002 HC RX W HCPCS: Performed by: REGISTERED NURSE

## 2021-05-22 PROCEDURE — 6360000002 HC RX W HCPCS: Performed by: INTERNAL MEDICINE

## 2021-05-22 PROCEDURE — 2580000003 HC RX 258: Performed by: SPECIALIST

## 2021-05-22 PROCEDURE — 1200000000 HC SEMI PRIVATE

## 2021-05-22 PROCEDURE — 2580000003 HC RX 258: Performed by: REGISTERED NURSE

## 2021-05-22 RX ORDER — SODIUM CHLORIDE 0.9 % (FLUSH) 0.9 %
5-40 SYRINGE (ML) INJECTION PRN
Status: DISCONTINUED | OUTPATIENT
Start: 2021-05-22 | End: 2021-05-24 | Stop reason: HOSPADM

## 2021-05-22 RX ORDER — HEPARIN SODIUM (PORCINE) LOCK FLUSH IV SOLN 100 UNIT/ML 100 UNIT/ML
3 SOLUTION INTRAVENOUS PRN
Status: DISCONTINUED | OUTPATIENT
Start: 2021-05-22 | End: 2021-05-24 | Stop reason: HOSPADM

## 2021-05-22 RX ORDER — LIDOCAINE HYDROCHLORIDE 10 MG/ML
5 INJECTION, SOLUTION EPIDURAL; INFILTRATION; INTRACAUDAL; PERINEURAL ONCE
Status: DISCONTINUED | OUTPATIENT
Start: 2021-05-22 | End: 2021-05-24 | Stop reason: HOSPADM

## 2021-05-22 RX ORDER — SODIUM CHLORIDE 9 MG/ML
25 INJECTION, SOLUTION INTRAVENOUS PRN
Status: DISCONTINUED | OUTPATIENT
Start: 2021-05-22 | End: 2021-05-24 | Stop reason: HOSPADM

## 2021-05-22 RX ORDER — SODIUM CHLORIDE 0.9 % (FLUSH) 0.9 %
5-40 SYRINGE (ML) INJECTION EVERY 12 HOURS SCHEDULED
Status: DISCONTINUED | OUTPATIENT
Start: 2021-05-22 | End: 2021-05-24 | Stop reason: HOSPADM

## 2021-05-22 RX ORDER — HEPARIN SODIUM (PORCINE) LOCK FLUSH IV SOLN 100 UNIT/ML 100 UNIT/ML
3 SOLUTION INTRAVENOUS EVERY 12 HOURS SCHEDULED
Status: DISCONTINUED | OUTPATIENT
Start: 2021-05-22 | End: 2021-05-24 | Stop reason: HOSPADM

## 2021-05-22 RX ADMIN — PANTOPRAZOLE SODIUM 40 MG: 40 TABLET, DELAYED RELEASE ORAL at 06:25

## 2021-05-22 RX ADMIN — WATER 2000 MG: 1 INJECTION INTRAMUSCULAR; INTRAVENOUS; SUBCUTANEOUS at 15:00

## 2021-05-22 RX ADMIN — Medication 10 ML: at 21:02

## 2021-05-22 RX ADMIN — ACETAMINOPHEN 650 MG: 325 TABLET ORAL at 09:10

## 2021-05-22 RX ADMIN — ENOXAPARIN SODIUM 40 MG: 40 INJECTION SUBCUTANEOUS at 09:11

## 2021-05-22 RX ADMIN — Medication 10 ML: at 10:05

## 2021-05-22 RX ADMIN — Medication 300 UNITS: at 21:03

## 2021-05-22 RX ADMIN — SODIUM CHLORIDE, PRESERVATIVE FREE 10 ML: 5 INJECTION INTRAVENOUS at 21:02

## 2021-05-22 RX ADMIN — ACETAMINOPHEN 650 MG: 325 TABLET ORAL at 15:31

## 2021-05-22 ASSESSMENT — PAIN DESCRIPTION - PAIN TYPE: TYPE: SURGICAL PAIN

## 2021-05-22 ASSESSMENT — PAIN DESCRIPTION - LOCATION: LOCATION: SHOULDER

## 2021-05-22 ASSESSMENT — PAIN SCALES - GENERAL
PAINLEVEL_OUTOF10: 2
PAINLEVEL_OUTOF10: 2
PAINLEVEL_OUTOF10: 7
PAINLEVEL_OUTOF10: 4

## 2021-05-22 ASSESSMENT — PAIN DESCRIPTION - ORIENTATION: ORIENTATION: RIGHT

## 2021-05-22 NOTE — PROGRESS NOTES
[facility]  Hospitalist   Progress Note    Admitting Date and Time: 5/18/2021  2:59 PM  Admit Dx: Uncontrolled pain [R52]    Subjective: Admitted on 18th, came with back pain, patient with stage I left invasive ductal carcinoma, s/p lumpectomy and XRT, on Arimidex, also has MGUS, did have chills, tobacco use in past, lucencies noted on CT pelvis, patient with Streptococcus bacteremia, issues with urinary retention, seen by urology, creatinine stable, no evidence of obstructive uropathy, De Jesus catheter continued, voiding trials when patient ambulatory, no further  interventions planned, positive for rhinovirus, seen by oncology, lucencies in pelvic bones are nonspecific, patient with stable recent monoclonal protein levels. Seen by ID, high-grade strep pneumoniae bacteremia, suspected right shoulder septic arthritis, blood cultures repeated, orthopedic surgery on consult for right shoulder arthrocentesis. Seen by orthopedics, attempt to aspirate subacromial space not successful. Effusion seems to be isolated to the glenohumeral joint. Patient is planned n.p.o. after midnight and surgery tomorrow morning. As per urology patient will need follow-up with her urologist in [facility] once she returns there. Patient had HUSSEIN, normal LV as well as LV function. No vegetations or valve disease. No thrombus. No intra atrial shunting. Patient did undergo I&D and debridement of right shoulder subacromial bursa, evidence of inflammatory type synovitis involving right shoulder capsule. ID has continued ceftriaxone. Also planned for PICC. SW working for home IV antibiotic therapy. Patient was admitted with Uncontrolled pain [R52]. Patient feels comfortable, at this time awake, alert, resting in bed, does communicate well, does say she is able to get up and go to bathroom. Per RN: No over night complains. .    ROS: denies fever, chills, cp, sob, n/v, HA unless stated above.      sodium chloride flush 5-40 mL Intravenous 2 times per day    lidocaine  10 mL Intra-articular Once    cefTRIAXone (ROCEPHIN) IV  2,000 mg Intravenous Q24H    pantoprazole  40 mg Oral QAM AC    enoxaparin  40 mg Subcutaneous Daily    lidocaine  1 patch Transdermal Daily     sodium chloride flush, 5-40 mL, PRN  sodium chloride, 25 mL, PRN  promethazine, 12.5 mg, Q6H PRN   Or  ondansetron, 4 mg, Q6H PRN  oxyCODONE, 5 mg, Q4H PRN  morphine, 2 mg, Q3H PRN  bisacodyl, 10 mg, Daily PRN  albuterol, 2.5 mg, Q6H PRN  polyethylene glycol, 17 g, Daily PRN  acetaminophen, 650 mg, Q6H PRN   Or  acetaminophen, 650 mg, Q6H PRN  ibuprofen, 400 mg, Q6H PRN  oxyCODONE-acetaminophen, 1 tablet, Q4H PRN  oxyCODONE-acetaminophen, 1 tablet, Q4H PRN         Objective:    BP (!) 152/82   Pulse 68   Temp 98.2 °F (36.8 °C) (Oral)   Resp 16   Ht 5' 6\" (1.676 m)   Wt 160 lb (72.6 kg)   SpO2 99%   BMI 25.82 kg/m²   General Appearance: alert and oriented to person, place and time, well-developed and well-nourished, in no acute distress  Skin: warm and dry, no rash or erythema  Head: normocephalic and atraumatic  Eyes: pupils equal, round, and reactive to light, extraocular eye movements intact, conjunctivae normal  ENT: tympanic membrane, external ear and ear canal normal bilaterally, oropharynx clear and moist with normal mucous membranes  Neck: neck supple and non tender without mass, no thyromegaly or thyroid nodules, no cervical lymphadenopathy   Pulmonary/Chest: clear to auscultation bilaterally- no wheezes, rales or rhonchi, normal air movement, no respiratory distress  Cardiovascular: normal rate, normal S1 and S2, no gallops, intact distal pulses and no carotid bruits  Abdomen: soft, non-tender, non-distended, normal bowel sounds, no masses or organomegaly      Recent Labs     05/20/21  0910 05/21/21  1400 05/22/21  0618    135 137   K 3.3* 4.0 3.7   CL 94* 97* 100   CO2 27 27 26   BUN 8 11 12   CREATININE 0.7 0.7 0.6   GLUCOSE 98 118* 113* CALCIUM 9.7 9.7 9.8       Recent Labs     05/20/21  0910   WBC 3.6*   RBC 3.43*   HGB 11.8   HCT 33.7*   MCV 98.3   MCH 34.4   MCHC 35.0*   RDW 14.4      MPV 10.0     CRP 27.5  ESR 70  CT right shoulder with synovial effusion, no erosive changes suggestive of osteomyelitis. No soft tissue mass or evidence of abscess. Echo with normal LV, no wall motion abnormality, normal EF, normal diastolic function. K3.3  IgA 27/total IgG 1907/IgM 18  Bone scan mostly showing DJD  Gram stain from arthrocentesis did not show any organisms. Radiology:   NM BONE SCAN WHOLE BODY   Final Result   While there is subtle increased activity along the right sacroiliac joint, no   significant activity is otherwise seen to correspond to the multiple   bilateral lucent pelvic lesions on CT. This does not exclude malignancy as   certain malignancies such as multiple myeloma may be occult on bone scan. Correlation with SPEP and UPEP could be considered. Multifocal degenerative changes are otherwise favored as outlined above. CT SHOULDER RIGHT W WO CONTRAST   Final Result   1. Moderate right shoulder synovial effusion. 2.  No bony erosive changes to suggest osteomyelitis. 3.  No soft tissue mass or evidence of abscess. CT ABDOMEN PELVIS WO CONTRAST Additional Contrast? None   Final Result   1. No acute abnormality is seen in the abdomen or the pelvis. 2. No urolithiasis or hydronephrosis   3. 1.5 probable complex cyst in the right kidney. Sonographic evaluation   recommended. 4. Subtle lucencies in the pelvic bones are nonspecific and of questionable   significance. Possibility metastatic disease or multiple myeloma should be   considered. Clinical correlation is needed. Assessment:    Active Problems:    Uncontrolled pain    Intractable back pain  Resolved Problems:    * No resolved hospital problems. *      Plan:  1.  Constipation, patient had a suppository, could be related to the use of pain medications, will add MiraLAX as well as senna. Also Reglan in low doses. 2. Strep bacteremia, elevated procalcitonin, increased CRP, patient on ceftriaxone day 3, ID on board, final sensitivity pending. Patient improving on Ceftriaxone. planned for PICC. 3. Rhinovirus infection, patient remains on supportive care as well as isolation, no change. 4. Patient with known ductal carcinoma of breast, also MUGS, radiolucencies on pelvic CT-due as per oncology nonspecific, bone scan negative. 5. Suspected right shoulder septic arthritis, patient did undergo incision and drainage after negative HUSSEIN. 6. Electrolyte imbalance, hypokalemia, supplement. improved. 7. DVT prophylaxis with Lovenox. 8. DC planning once arrangements done for home antibiotics as well as from PT side and sub specialities. Likely in next 1-2 days.         Electronically signed by Jamel Covarrubias MD on 5/22/2021 at 9:06 AM

## 2021-05-22 NOTE — PROGRESS NOTES
[address] [place] [address] [facility]  Progress Note    SUBJECTIVE:  Chief Complaint   Patient presents with    Flank Pain     Right sided- started last night- hx of kidney stone     Patient is tolerating medications. Feeling & looking much better today. No fevers overnight  No shoulder pain post op. No N/V/D. Review of systems:  As stated above in the chief complaint, otherwise negative. Medications:  Scheduled Meds:   sodium chloride flush  5-40 mL Intravenous 2 times per day    lidocaine  10 mL Intra-articular Once    cefTRIAXone (ROCEPHIN) IV  2,000 mg Intravenous Q24H    pantoprazole  40 mg Oral QAM AC    enoxaparin  40 mg Subcutaneous Daily    lidocaine  1 patch Transdermal Daily     Continuous Infusions:   sodium chloride       PRN Meds:sodium chloride flush, sodium chloride, promethazine **OR** ondansetron, oxyCODONE, morphine, bisacodyl, albuterol, polyethylene glycol, acetaminophen **OR** acetaminophen, ibuprofen, oxyCODONE-acetaminophen, oxyCODONE-acetaminophen    OBJECTIVE:  BP (!) 152/82   Pulse 68   Temp 98.2 °F (36.8 °C) (Oral)   Resp 16   Ht 5' 6\" (1.676 m)   Wt 160 lb (72.6 kg)   SpO2 99%   BMI 25.82 kg/m²   Temp  Av.9 °F (36.6 °C)  Min: 97 °F (36.1 °C)  Max: 98.6 °F (37 °C)  Constitutional: The patient is awake, alert, and oriented. No distress. Much more comfortable today. Pain is much improved. Skin: Warm and dry. No rashes were noted. HEENT: Round and reactive pupils. Moist mucous membranes. No ulcerations or thrush. Neck: Supple to movements. Chest: No respiratory distress. Symmetrical expansion. No wheezing, crackles or rhonchi. Clear bialterally. Cardiovascular: S1 and S2 are rhythmic and regular. No murmurs appreciated. Abdomen: Positive bowel sounds to auscultation. Benign to palpation. No masses felt. Extremities: No edema. Right shoulder dressed post op with ice pack in place. Right arm is in sling.    Lines: following   · Monitor labs    Informed Consent for PICC:  I explained the risks, benefits, alternative options, and potential complications associated with insertion of Peripherally Inserted Central Catheter (PICC) with the patient prior to the procedure. Electronically signed by Tigre Hyde MD on 5/22/2021 at 2:44 PM     Fredi Schaumann, APRN - CNP  9:31 AM  5/22/2021     Patient seen and examined. I had a face to face encounter with the patient. Agree with exam.  Assessment and plan as outlined above and directed by me. Addition and corrections were done as deemed appropriate. My exam and plan include: The patient is doing better. She is tolerating antibiotics. The dressing was removed by her orthopedic surgery. She is having minimal pain. She says she will be going back to [facility] next Sunday. She was told that she could get antibiotics here until the day that she leaves. After that she will have to establish care with her primary care physician and possibly an infectious disease physician. She says she has an orthopedic surgeon there. Instructed to call next Monday so she can establish care there as soon as possible so they are not surprised when she shows up there on Sunday.     Tigre Hyde MD  5/22/2021  2:42 PM

## 2021-05-22 NOTE — PLAN OF CARE
Problem: Pain:  Goal: Pain level will decrease  Description: Pain level will decrease  Outcome: Met This Shift  Goal: Control of acute pain  Description: Control of acute pain  Outcome: Met This Shift     Problem: Falls - Risk of:  Goal: Will remain free from falls  Description: Will remain free from falls  Outcome: Ongoing  Goal: Absence of physical injury  Description: Absence of physical injury  Outcome: Ongoing     Problem: Pain:  Goal: Control of chronic pain  Description: Control of chronic pain  Outcome: Ongoing     Problem: FLUID AND ELECTROLYTE IMBALANCE  Goal: Fluid and electrolyte balance are achieved/maintained  Outcome: Ongoing     Problem: Mobility - Impaired  Goal: Mobility level is maintained or improved  Outcome: Ongoing     Problem: Skin Integrity:  Goal: Will show no infection signs and symptoms  Description: Will show no infection signs and symptoms  Outcome: Ongoing  Goal: Absence of new skin breakdown  Description: Absence of new skin breakdown  Outcome: Ongoing

## 2021-05-22 NOTE — DISCHARGE INSTR - COC
the patient has a fever or increasing drainage or foul odor from a wound. Notify the treating physician in a timely manner  Stool specimen. If diarrhea occurs while on antibiotics, send stools for C. difficile and WBCs. When a drug is discontinued due to a low white blood cell count (WBCs) draw two consecutive CBC with differential and BUN, Ceatinine. ALLERGIC OR ADVERSE REACTIONS TO MEDICATIONS  Mild reaction: (itching, with or without rash):  Administer Benadryl 50mg po x 1, then 25mg po q6h prn. Notify office or physician in a timely matter. Moderate reaction (itching with or without rash and/or wheezing, dyspnea, itchy throat):  Administer Benadryl 50 mg IV push x 1. Notify office or physician in a timely manner. Severe reaction i.e. Anaphylaxis (wheezing or stidor, sudden rash, lightheadedness, hypotension):  Administer epinephrine subcutaneous 0.3mg (1:1000) x 1 dose. May repeat twice every 5 minutes if needed. Call EMS and notify office or physician immediately. For all above reactions: administer Solu-Cortef 100mg slow IV push x 1. VASCULAR ACCESS DRESSING CHANGE PROTOCOL  Cleanse insertion site with ChlorPrep® or equivalent three times. Secure catheter with Steri-Strips®, Bone®, or equivalent securing device. Apply Opsite® 3000 or equivalent transparent dressing. Change dressing twice weekly, maintaining sterile technique. If there is a BioPatch®, SilverSite® or equivalent, change once weekly only or as needed. FOLLOW-UP VISITS  Nursing staff should call the office during business hours to schedule a follow-up appointment once the patient is admitted to the service or facility. Every effort should be made to have patient follow-up within 2 weeks of discharge. Exception is made for ventilator-dependent patients.   Continue IV antibiotic therapy until patient is seen in the office or unless specific stop date is noted on the original order or unless otherwise ordered by

## 2021-05-23 LAB
ANION GAP SERPL CALCULATED.3IONS-SCNC: 8 MMOL/L (ref 7–16)
BUN BLDV-MCNC: 14 MG/DL (ref 6–23)
CALCIUM SERPL-MCNC: 9.6 MG/DL (ref 8.6–10.2)
CHLORIDE BLD-SCNC: 104 MMOL/L (ref 98–107)
CO2: 27 MMOL/L (ref 22–29)
CREAT SERPL-MCNC: 0.7 MG/DL (ref 0.5–1)
GFR AFRICAN AMERICAN: >60
GFR NON-AFRICAN AMERICAN: >60 ML/MIN/1.73
GLUCOSE BLD-MCNC: 90 MG/DL (ref 74–99)
POTASSIUM SERPL-SCNC: 3.3 MMOL/L (ref 3.5–5)
SODIUM BLD-SCNC: 139 MMOL/L (ref 132–146)

## 2021-05-23 PROCEDURE — 6360000002 HC RX W HCPCS: Performed by: REGISTERED NURSE

## 2021-05-23 PROCEDURE — 6360000002 HC RX W HCPCS: Performed by: SPECIALIST

## 2021-05-23 PROCEDURE — 2580000003 HC RX 258: Performed by: REGISTERED NURSE

## 2021-05-23 PROCEDURE — 97161 PT EVAL LOW COMPLEX 20 MIN: CPT

## 2021-05-23 PROCEDURE — 6370000000 HC RX 637 (ALT 250 FOR IP): Performed by: INTERNAL MEDICINE

## 2021-05-23 PROCEDURE — 99232 SBSQ HOSP IP/OBS MODERATE 35: CPT | Performed by: INTERNAL MEDICINE

## 2021-05-23 PROCEDURE — 80048 BASIC METABOLIC PNL TOTAL CA: CPT

## 2021-05-23 PROCEDURE — 2580000003 HC RX 258: Performed by: SPECIALIST

## 2021-05-23 PROCEDURE — 36415 COLL VENOUS BLD VENIPUNCTURE: CPT

## 2021-05-23 PROCEDURE — 6360000002 HC RX W HCPCS: Performed by: INTERNAL MEDICINE

## 2021-05-23 PROCEDURE — 97165 OT EVAL LOW COMPLEX 30 MIN: CPT

## 2021-05-23 PROCEDURE — 2580000003 HC RX 258: Performed by: GENERAL ACUTE CARE HOSPITAL

## 2021-05-23 PROCEDURE — 1200000000 HC SEMI PRIVATE

## 2021-05-23 RX ORDER — POTASSIUM CHLORIDE 20 MEQ/1
40 TABLET, EXTENDED RELEASE ORAL ONCE
Status: COMPLETED | OUTPATIENT
Start: 2021-05-23 | End: 2021-05-23

## 2021-05-23 RX ADMIN — Medication 10 ML: at 09:00

## 2021-05-23 RX ADMIN — OXYCODONE HYDROCHLORIDE AND ACETAMINOPHEN 1 TABLET: 5; 325 TABLET ORAL at 21:08

## 2021-05-23 RX ADMIN — Medication 10 ML: at 21:08

## 2021-05-23 RX ADMIN — Medication 300 UNITS: at 15:30

## 2021-05-23 RX ADMIN — PANTOPRAZOLE SODIUM 40 MG: 40 TABLET, DELAYED RELEASE ORAL at 06:02

## 2021-05-23 RX ADMIN — OXYCODONE HYDROCHLORIDE AND ACETAMINOPHEN 1 TABLET: 5; 325 TABLET ORAL at 04:37

## 2021-05-23 RX ADMIN — POTASSIUM CHLORIDE 40 MEQ: 20 TABLET, EXTENDED RELEASE ORAL at 12:00

## 2021-05-23 RX ADMIN — OXYCODONE HYDROCHLORIDE AND ACETAMINOPHEN 1 TABLET: 5; 325 TABLET ORAL at 16:04

## 2021-05-23 RX ADMIN — ENOXAPARIN SODIUM 40 MG: 40 INJECTION SUBCUTANEOUS at 08:55

## 2021-05-23 RX ADMIN — WATER 2000 MG: 1 INJECTION INTRAMUSCULAR; INTRAVENOUS; SUBCUTANEOUS at 15:00

## 2021-05-23 RX ADMIN — Medication 300 UNITS: at 21:09

## 2021-05-23 RX ADMIN — SODIUM CHLORIDE, PRESERVATIVE FREE 10 ML: 5 INJECTION INTRAVENOUS at 08:54

## 2021-05-23 RX ADMIN — ACETAMINOPHEN 650 MG: 325 TABLET ORAL at 08:54

## 2021-05-23 RX ADMIN — OXYCODONE HYDROCHLORIDE AND ACETAMINOPHEN 1 TABLET: 5; 325 TABLET ORAL at 11:19

## 2021-05-23 RX ADMIN — SODIUM CHLORIDE, PRESERVATIVE FREE 10 ML: 5 INJECTION INTRAVENOUS at 21:09

## 2021-05-23 ASSESSMENT — PAIN SCALES - GENERAL
PAINLEVEL_OUTOF10: 4
PAINLEVEL_OUTOF10: 5
PAINLEVEL_OUTOF10: 2
PAINLEVEL_OUTOF10: 4
PAINLEVEL_OUTOF10: 2
PAINLEVEL_OUTOF10: 7
PAINLEVEL_OUTOF10: 2
PAINLEVEL_OUTOF10: 3
PAINLEVEL_OUTOF10: 1
PAINLEVEL_OUTOF10: 7
PAINLEVEL_OUTOF10: 2

## 2021-05-23 ASSESSMENT — PAIN DESCRIPTION - LOCATION
LOCATION: SHOULDER

## 2021-05-23 ASSESSMENT — PAIN DESCRIPTION - PROGRESSION
CLINICAL_PROGRESSION: GRADUALLY IMPROVING
CLINICAL_PROGRESSION: GRADUALLY WORSENING
CLINICAL_PROGRESSION: GRADUALLY WORSENING
CLINICAL_PROGRESSION: GRADUALLY IMPROVING
CLINICAL_PROGRESSION: GRADUALLY IMPROVING

## 2021-05-23 ASSESSMENT — PAIN DESCRIPTION - ORIENTATION
ORIENTATION: RIGHT

## 2021-05-23 NOTE — PROGRESS NOTES
[address] [place] [address] [facility]  Progress Note    SUBJECTIVE:  Chief Complaint   Patient presents with    Flank Pain     Right sided- started last night- hx of kidney stone     Patient is tolerating medications. Up to chair. Says shoulder is starting to feel stiff but no other issues  No fevers. Review of systems:  As stated above in the chief complaint, otherwise negative. Medications:  Scheduled Meds:   lidocaine PF  5 mL Intradermal Once    sodium chloride flush  5-40 mL Intravenous 2 times per day    heparin flush  3 mL Intravenous 2 times per day    sodium chloride flush  5-40 mL Intravenous 2 times per day    lidocaine  10 mL Intra-articular Once    cefTRIAXone (ROCEPHIN) IV  2,000 mg Intravenous Q24H    pantoprazole  40 mg Oral QAM AC    enoxaparin  40 mg Subcutaneous Daily    lidocaine  1 patch Transdermal Daily     Continuous Infusions:   sodium chloride      sodium chloride       PRN Meds:sodium chloride flush, sodium chloride, heparin flush, sodium chloride flush, sodium chloride, promethazine **OR** ondansetron, oxyCODONE, morphine, bisacodyl, albuterol, polyethylene glycol, acetaminophen **OR** acetaminophen, ibuprofen, oxyCODONE-acetaminophen, oxyCODONE-acetaminophen    OBJECTIVE:  BP (!) 143/75   Pulse 69   Temp 98.4 °F (36.9 °C) (Temporal)   Resp 16   Ht 5' 6\" (1.676 m)   Wt 160 lb (72.6 kg)   SpO2 95%   BMI 25.82 kg/m²   Temp  Av.2 °F (36.8 °C)  Min: 97.5 °F (36.4 °C)  Max: 98.6 °F (37 °C)  Constitutional: The patient is awake, alert, and oriented. No distress. Up to chair. Skin: Warm and dry. No rashes were noted. HEENT: Round and reactive pupils. Moist mucous membranes. No ulcerations or thrush. Neck: Supple to movements. Chest: No respiratory distress. Symmetrical expansion. No wheezing, crackles or rhonchi. Clear bialterally. Cardiovascular: S1 and S2 are rhythmic and regular. No murmurs appreciated.    Abdomen: Positive bowel enterovirus   · History of breast cancer     PLAN:  · Continue Ceftraxione for 3 weeks total - reconciled  · Check final cultures - asked Micro to hold surgical cultures   · Oncology following   · Monitor labs  · Was asking about possibility of going to infusion center for the week - will check with case management. · Again discussed importance of establishing with her PCP as well as potentially an ID doctor in [facility] when she leaves to complete her treatment. Surya Davis, APRN - CNP  9:57 AM  5/23/2021     Patient seen and examined. I had a face to face encounter with the patient. Agree with exam.  Assessment and plan as outlined above and directed by me. Addition and corrections were done as deemed appropriate. My exam and plan include: The patient is doing well. She is trying to get up and walk around. He has a PICC in place. She is willing to come to the infusion center for the next week until she goes to the [facility]. She has contacted her PCP so IV antibiotics can be arranged at an infusion center that is close to her home. She can be discharged. We have asked case management to arrange for IV antibiotics at the infusion center.     Alvaro Forde MD  5/23/2021  1:58 PM

## 2021-05-23 NOTE — PROGRESS NOTES
Initial Evaluation      Attending Provider:  Lyn Pool*    Evaluating PT:  [facility]    Room #:  3955/8294-W  Diagnosis:  Admit for LBP. Septic arthritis/effusion of the right shoulder  Pertinent PMHx/PSHx:  Asthma, [place]  Procedure/Surgery:  ARTHROSCOPIC INCISION AND DRAINAGE RIGHT SHOULDER WITH DEBRIDEMENT OF SUBACROMIAL BURSA   Precautions:  Contact Isol, Full thickness RTC supraspinatus tear,   Equipment Needs:  [employer], sling RTUE for comfort    SUBJECTIVE:    Pt livesin [place] and is here to see family. Will be staying with mom/dad on D/C in 2 story home with MIKAYLA. Pt ambulated with no AD PTA. OBJECTIVE:   Initial Evaluation  Date: 5/23/21 Treatment Short Term/ Long Term   Goals   Was pt agreeable to Eval/treatment? Yes     Does pt have pain? Back pain primarily and RT shld with mvmt only     Bed Mobility   NA  Indep all levels   Transfers Sit to stand: Min/Mod A 2/2 back pain  Stand to sit: SBA/S  Stand pivot: SBA/S  Indep/S all levels and surfaces   Ambulation   10 feet with no AD High CGA 2/2 back pain  100+ feet with [employer] or AD  Indep   Stair negotiation: ascended and descended  TBA  3-4 steps with 1 rail Indep   ROM LE WNL     MMT LE WNL     AM-PAC 6 Clicks 19/87       Pt is A & O x 3   Sensation:  Pt denies numbness and tingling to extremities  Edema:  NA  Balance: sitting:Indep standing: F- w/o AD   Endurance: Functional    Patient education  Pt educated on sling for comfort, AROM allowed/Ortho/OT to see and review,     Patient response to education:   Pt verbalized understanding Pt demonstrated skill Pt requires further education in this area   Yes Yes No     ASSESSMENT:    Comments: In hip chair on arrival with no sling RTUE. Pillow under RTUE for comfort. In no distress and reports no pain really in RT shld and most is in the back. Reports back pain did improve following the RT shld Sx but due to the bad bed situation, the back pain came back but not as severe.   Did not use any AD PTA and she relates her current immobility and difficulty with ambulation to her back pain. She did require Min to Mod assist to stand from chair with a very narrow SUSAN. Mobility w/o AD was a hand held RTUE sling position with a slow unsteady performance. No wt bearing orders Ortho provided; assume WBAT but will ck with RN. She will need an AD for safe mobility once cleared for usage. Returned back into hip chair with RTUE pillow under arm for comfort. Call light and wall phone to LT side to allow LT hand grasp. Tray brought to FWD position for LE base support. Very appreciative for care. Treatment:  Patient practiced and was instructed in the following treatment:     Eval    Pt's/ family goals   1. Less pain back    Patient and or family understand(s) diagnosis, prognosis, and plan of care. PLAN:    PT care will be provided in accordance with the objectives noted above. Exercises and functional mobility practice will be used as well as appropriate assistive devices or modalities to obtain goals. Patient and family education will also be administered as needed. Frequency of treatments:  Daily    Total Treatment Time  0 minutes     Evaluation Time includes thorough review of current medical information, gathering information on past medical history/social history and prior level of function, completion of standardized testing/informal observation of tasks, assessment of data and education on plan of care and goals.     CPT codes:  [x] Low Complexity PT evaluation Penelope Romero PT

## 2021-05-23 NOTE — PROGRESS NOTES
Occupational Therapy  OCCUPATIONAL THERAPY INITIAL EVALUATION      Date:2021  Patient Name: Mina Thornton  MRN: [id number]  : 1960  Room: [address] [place], [place] [address]    OCCUPATIONAL THERAPY INITIAL EVALUATION    [facility] M[facility] [address]          Date:2021                                                  Patient Name: Mina Thornton    MRN: [id number]    : 1960    Room: [address] [place], [place] [address]      Evaluating OT: Tricia Corona OTR/L   [id number]      Referring Provider:Chato Alvares DO     Specific Provider Orders/Date:OT eval and treat 2021      Diagnosis: uncontrolled pain  Presented to ED on 2021   for sudden onset aching, sharp, shooting, stabbing pain in the right flank without radiation which began a few day    Septic arthritis/effusion of the right shoulder    Surgery: ARTHROSCOPIC INCISION AND DRAINAGE RIGHT SHOULDER WITH DEBRIDEMENT OF SUBACROMIAL BURSA  2021    Pertinent Medical History: asthma, [place]    Precautions:  Fall Risk, Droplet isolation per ortho note: Patient is OK for ROM, activity as tolerated  May use sling prn for comfort     Would avoid showers for 3-4 days     Assessment of current deficits    [] Functional mobility  [x]ADLs  [x] Strength               [x]Cognition    [x] Functional transfers   [x] IADLs         [x] Safety Awareness   [x]Endurance    [x] Fine Coordination              [x] Balance      [] Vision/perception   [x]Sensation     []Gross Motor Coordination  [x] ROM  [] Delirium                   [] Motor Control     OT PLAN OF CARE   OT POC based on physician orders, patient diagnosis and results of clinical assessment    Frequency/Duration  2-4days/wk for 2 weeks PRN   Specific OT Treatment Interventions to include:   [x]ADL retraining/adapted techniques and AE recommendations to increase functional independence within precautions                    [x]Energy conservation techniques to improve tolerance for selfcare routine   [x]Functional transfer/mobility training/DME recommendations for increased independence, safety and fall prevention         [x]Patient/family education to increase safety and functional independence             [x]Environmental modifications for safe mobility and completion of ADLs                             [x]Therapeutic activity to improve functional performance during ADLs. [x]Therapeutic exercise to improve tolerance and functional strength for ADLs   [x]Balance retraining/tolerance tasks for facilitation of postural control with dynamic challenges during ADLs . [x]Positioning to improve functional independence  []Cognitive retraining ex's to improve problem solving skills & safe participation in ADLs/IADLs     []Sensory re-education techniques to improve extremity awareness, maintain skin integrity and improve hand function                             []Visual/Perceptual retraining  to improve body awareness and safety during transfers and ADLs  []Splinting/positioning needs to maintain joint/skin integrity and prevent contractures. []Neuromuscular re-education: facilitation of righting/equilibrium reactions, midline orientation, scapular stability/mobility, Normalization muscle     tone and facilitation active functional movement/Attention                         []Delirium prevention/treatment  []Other    Recommended Adaptive Equipment: walker     Home Living: Pt lives in [place], here visiting her elderly parents. Parents have a  2 story with 1/2 bath on 1st recliner on 1st floor she can sleep in.   3 steps/rail   Bed/bath on 2nd.      Bathroom setup: walk in shower in basement, tub only 2nd floor   (In [place] lives with  in 1 story)    Prior Level of Function: Independent  with ADLs , Independent  with IADLs; ambulated with no device   Driving: yes   Occupation: employed with [employer] - works from home     Pain Level: back pain with movement ;   Cognition: A&O: 4/4;   Memory:  Good    Sequencing:  Good    Problem solving:  Good    Judgement/safety:  Good      Functional Assessment:  AM-PAC Daily Activity Raw Score: 16/24   Initial Eval Status  Date: 5/23/21 Treatment Status  Date: STGs = LTGs  Time frame: 10-14 days   Feeding Set-up   Independent    Grooming SBA/set-up   Standing at sink with wash hygiene   Mod I    UB Dressing Mod  A  Mod I    LB Dressing Mod A   Able to initiate crossing leg to adjust socks   Mod I    Bathing Mod A   Mod I    Toileting Assist with thorough hygiene     Bed Mobility  Up in chair upon entry       Functional Transfers Min A  Sit-stand from chair   Mod I    Functional Mobility CGA,w/walker  Ambulating in room   Mod I with good tolerance    Balance Sitting:     Static:  Independent     Dynamic:SBA   Standing: CGA/SBA      Activity Tolerance Fair  With light activity   Good  with ADL activity    Visual/  Perceptual Glasses: yes         R UE  Decrease R UE ROM,   Increase pain   Tolerate UE ROM and strengthening exercises to improve AROM for ADLS     Hand Dominance right    AROM (PROM) Strength Additional Info:    RUE   Patient holds arm guarded   AROM throughout   [address] distally - at times patient inconsistent   Shoulder shrug   Progress as tolerated       LUE WFL 5/5 good  and wfl FMC/dexterity noted during ADL tasks       [facility] [place]   Sensation:  No c/o numbness or tingling   Tone: WFL     Comments: Upon arrival patient sitting in chair . At end of session, patient returned to chair  with call light and phone within reach, all lines and tubes intact. Overall patient demonstrated  decreased independence and safety during completion of ADL/functional transfer/mobility tasks. Pt would benefit from continued skilled OT to increase safety and independence with completion of ADL/IADL tasks for functional independence and quality of life.         Rehab Potential: good for established goals

## 2021-05-23 NOTE — PLAN OF CARE
Problem: Falls - Risk of:  Goal: Will remain free from falls  Description: Will remain free from falls  5/23/2021 1729 by Shant Shea RN  Outcome: Met This Shift  5/23/2021 0556 by Souleymane Otto RN  Outcome: Met This Shift  Goal: Absence of physical injury  Description: Absence of physical injury  5/23/2021 1729 by Shant Shea RN  Outcome: Met This Shift  5/23/2021 0556 by Souleymane Otto RN  Outcome: Met This Shift     Problem: Pain:  Goal: Pain level will decrease  Description: Pain level will decrease  5/23/2021 1729 by Shant Shea RN  Outcome: Met This Shift  5/23/2021 0556 by Souleymane Otto RN  Outcome: Met This Shift  Goal: Control of acute pain  Description: Control of acute pain  5/23/2021 1729 by Shant Shea RN  Outcome: Met This Shift  5/23/2021 0556 by Souleymane Otto RN  Outcome: Met This Shift     Problem: Pain:  Goal: Control of chronic pain  Description: Control of chronic pain  Outcome: Ongoing     Problem: FLUID AND ELECTROLYTE IMBALANCE  Goal: Fluid and electrolyte balance are achieved/maintained  Outcome: Ongoing     Problem: Mobility - Impaired  Goal: Mobility level is maintained or improved  Outcome: Ongoing     Problem: Skin Integrity:  Goal: Will show no infection signs and symptoms  Description: Will show no infection signs and symptoms  5/23/2021 1729 by Shant Shea RN  Outcome: Ongoing  5/23/2021 0556 by Souleymane Otto RN  Outcome: Met This Shift  Goal: Absence of new skin breakdown  Description: Absence of new skin breakdown  5/23/2021 1729 by Shant Shea RN  Outcome: Ongoing  5/23/2021 0556 by Souleymane Otto RN  Outcome: Met This Shift

## 2021-05-23 NOTE — PROGRESS NOTES
[facility]   [facility] Note    Admitting Date and Time: 5/18/2021  2:59 PM  Admit Dx: Uncontrolled pain [R52]    Subjective: Admitted on 18th, came with back pain, patient with stage I left invasive ductal carcinoma, s/p lumpectomy and XRT, on Arimidex, also has MGUS, did have chills, tobacco use in past, lucencies noted on CT pelvis, patient with Streptococcus bacteremia, issues with urinary retention, seen by urology, creatinine stable, no evidence of obstructive uropathy, De Jesus catheter continued, voiding trials when patient ambulatory, no further  interventions planned, positive for rhinovirus, seen by oncology, lucencies in pelvic bones are nonspecific, patient with stable recent monoclonal protein levels. Seen by ID, high-grade strep pneumoniae bacteremia, suspected right shoulder septic arthritis, blood cultures repeated, orthopedic surgery on consult for right shoulder arthrocentesis. Seen by orthopedics, attempt to aspirate subacromial space not successful. Effusion seems to be isolated to the glenohumeral joint. Patient is planned n.p.o. after midnight and surgery tomorrow morning. As per urology patient will need follow-up with her urologist in [facility] once she returns there. Patient had HUSSEIN, normal LV as well as LV function. No vegetations or valve disease. No thrombus. No intra atrial shunting. Patient did undergo I&D and debridement of right shoulder subacromial bursa, evidence of inflammatory type synovitis involving right shoulder capsule. ID has continued ceftriaxone. Also planned for PICC. SW working for home IV antibiotic therapy. As per orthopedics patient okay for ROM, sling as needed for comfort, avoid showers for 3 to 4 days, sutures out in 2 weeks. Patient had PICC line placed. Planned for 3 weeks of ceftriaxon. Patient was admitted with Uncontrolled pain [R52].  Patient feels comfortable, at this time awake, alert, sitting in chair, does communicate well, does say that she cannot go home because does not have enough strength in the legs, wants to see physical therapy as well as social work, does understand that she is going to have antibiotics and arrangements are being done for the antibiotic delivery in the outpatient setting. Per RN: No over night complains. .    ROS: denies fever, chills, cp, sob, n/v, HA unless stated above.      lidocaine PF  5 mL Intradermal Once    sodium chloride flush  5-40 mL Intravenous 2 times per day    heparin flush  3 mL Intravenous 2 times per day    sodium chloride flush  5-40 mL Intravenous 2 times per day    lidocaine  10 mL Intra-articular Once    cefTRIAXone (ROCEPHIN) IV  2,000 mg Intravenous Q24H    pantoprazole  40 mg Oral QAM AC    enoxaparin  40 mg Subcutaneous Daily    lidocaine  1 patch Transdermal Daily     sodium chloride flush, 5-40 mL, PRN  sodium chloride, 25 mL, PRN  heparin flush, 3 mL, PRN  sodium chloride flush, 5-40 mL, PRN  sodium chloride, 25 mL, PRN  promethazine, 12.5 mg, Q6H PRN   Or  ondansetron, 4 mg, Q6H PRN  oxyCODONE, 5 mg, Q4H PRN  morphine, 2 mg, Q3H PRN  bisacodyl, 10 mg, Daily PRN  albuterol, 2.5 mg, Q6H PRN  polyethylene glycol, 17 g, Daily PRN  acetaminophen, 650 mg, Q6H PRN   Or  acetaminophen, 650 mg, Q6H PRN  ibuprofen, 400 mg, Q6H PRN  oxyCODONE-acetaminophen, 1 tablet, Q4H PRN  oxyCODONE-acetaminophen, 1 tablet, Q4H PRN         Objective:    BP (!) 143/75   Pulse 69   Temp 98.4 °F (36.9 °C) (Temporal)   Resp 16   Ht 5' 6\" (1.676 m)   Wt 160 lb (72.6 kg)   SpO2 95%   BMI 25.82 kg/m²   General Appearance: alert and oriented to person, place and time, well-developed and well-nourished, in no acute distress  Skin: warm and dry, no rash or erythema  Head: normocephalic and atraumatic  Eyes: pupils equal, round, and reactive to light, extraocular eye movements intact, conjunctivae normal  ENT: tympanic membrane, external ear and ear canal normal bilaterally, oropharynx clear and moist with normal mucous membranes  Neck: neck supple and non tender without mass, no thyromegaly or thyroid nodules, no cervical lymphadenopathy   Pulmonary/Chest: clear to auscultation bilaterally- no wheezes, rales or rhonchi, normal air movement, no respiratory distress  Cardiovascular: normal rate, normal S1 and S2, no gallops, intact distal pulses and no carotid bruits  Abdomen: soft, non-tender, non-distended, normal bowel sounds, no masses or organomegaly      Recent Labs     05/21/21  1400 05/22/21  0618 05/23/21  0308    137 139   K 4.0 3.7 3.3*   CL 97* 100 104   CO2 27 26 27   BUN 11 12 14   CREATININE 0.7 0.6 0.7   GLUCOSE 118* 113* 90   CALCIUM 9.7 9.8 9.6       Recent Labs     05/20/21  0910   WBC 3.6*   RBC 3.43*   HGB 11.8   HCT 33.7*   MCV 98.3   MCH 34.4   MCHC 35.0*   RDW 14.4      MPV 10.0     CRP 27.5  ESR 70  CT right shoulder with synovial effusion, no erosive changes suggestive of osteomyelitis. No soft tissue mass or evidence of abscess. Echo with normal LV, no wall motion abnormality, normal EF, normal diastolic function. K3.3  IgA 27/total IgG 1907/IgM 18  Bone scan mostly showing DJD  Gram stain from arthrocentesis did not show any organisms. Radiology:   NM BONE SCAN WHOLE BODY   Final Result   While there is subtle increased activity along the right sacroiliac joint, no   significant activity is otherwise seen to correspond to the multiple   bilateral lucent pelvic lesions on CT. This does not exclude malignancy as   certain malignancies such as multiple myeloma may be occult on bone scan. Correlation with SPEP and UPEP could be considered. Multifocal degenerative changes are otherwise favored as outlined above. CT SHOULDER RIGHT W WO CONTRAST   Final Result   1. Moderate right shoulder synovial effusion. 2.  No bony erosive changes to suggest osteomyelitis. 3.  No soft tissue mass or evidence of abscess.          CT ABDOMEN PELVIS WO CONTRAST Additional Contrast? None   Final Result   1. No acute abnormality is seen in the abdomen or the pelvis. 2. No urolithiasis or hydronephrosis   3. 1.5 probable complex cyst in the right kidney. Sonographic evaluation   recommended. 4. Subtle lucencies in the pelvic bones are nonspecific and of questionable   significance. Possibility metastatic disease or multiple myeloma should be   considered. Clinical correlation is needed. Assessment:    Active Problems:    Uncontrolled pain    Intractable back pain  Resolved Problems:    * No resolved hospital problems. *      Plan:  1. Constipation, patient had a suppository, could be related to the use of pain medications, will add MiraLAX as well as senna. Also Reglan in low doses. 2. Strep bacteremia, elevated procalcitonin, increased CRP, patient on ceftriaxone day 3, ID on board, final sensitivity pending. Patient improving on Ceftriaxone. planned for PICC. 3. Rhinovirus infection, patient remains on supportive care as well as isolation, no change. 4. Patient with known ductal carcinoma of breast, also MUGS, radiolucencies on pelvic CT-due as per oncology nonspecific, bone scan negative. 5. Suspected right shoulder septic arthritis, patient did undergo incision and drainage after negative HUSSEIN. 6. Electrolyte imbalance, hypokalemia, supplement. improved. 7. DVT prophylaxis with Lovenox. 8. DC planning once arrangements done for home antibiotics as well as from PT side and sub specialities. most likely by tomorrow once more involvement from physical therapy and social work available.         Electronically signed by Ericka Gomez MD on 5/23/2021 at 8:54 AM

## 2021-05-24 VITALS
SYSTOLIC BLOOD PRESSURE: 140 MMHG | DIASTOLIC BLOOD PRESSURE: 79 MMHG | RESPIRATION RATE: 17 BRPM | WEIGHT: 160 LBS | BODY MASS INDEX: 25.71 KG/M2 | HEART RATE: 80 BPM | OXYGEN SATURATION: 95 % | HEIGHT: 66 IN | TEMPERATURE: 99.3 F

## 2021-05-24 DIAGNOSIS — M00.9 PYOGENIC ARTHRITIS OF RIGHT SHOULDER REGION, DUE TO UNSPECIFIED ORGANISM (HCC): ICD-10-CM

## 2021-05-24 LAB
ANION GAP SERPL CALCULATED.3IONS-SCNC: 11 MMOL/L (ref 7–16)
BETA-2 MICROGLOBULIN: 2.1 MG/L (ref 0.6–2.4)
BUN BLDV-MCNC: 13 MG/DL (ref 6–23)
CALCIUM SERPL-MCNC: 10 MG/DL (ref 8.6–10.2)
CHLORIDE BLD-SCNC: 99 MMOL/L (ref 98–107)
CO2: 26 MMOL/L (ref 22–29)
CREAT SERPL-MCNC: 0.6 MG/DL (ref 0.5–1)
GFR AFRICAN AMERICAN: >60
GFR NON-AFRICAN AMERICAN: >60 ML/MIN/1.73
GLUCOSE BLD-MCNC: 106 MG/DL (ref 74–99)
POTASSIUM SERPL-SCNC: 3.8 MMOL/L (ref 3.5–5)
SODIUM BLD-SCNC: 136 MMOL/L (ref 132–146)

## 2021-05-24 PROCEDURE — 6360000002 HC RX W HCPCS: Performed by: REGISTERED NURSE

## 2021-05-24 PROCEDURE — 97110 THERAPEUTIC EXERCISES: CPT

## 2021-05-24 PROCEDURE — 6360000002 HC RX W HCPCS: Performed by: SPECIALIST

## 2021-05-24 PROCEDURE — 80048 BASIC METABOLIC PNL TOTAL CA: CPT

## 2021-05-24 PROCEDURE — 97535 SELF CARE MNGMENT TRAINING: CPT

## 2021-05-24 PROCEDURE — 2580000003 HC RX 258: Performed by: SPECIALIST

## 2021-05-24 PROCEDURE — 6360000002 HC RX W HCPCS: Performed by: INTERNAL MEDICINE

## 2021-05-24 PROCEDURE — 99239 HOSP IP/OBS DSCHRG MGMT >30: CPT | Performed by: INTERNAL MEDICINE

## 2021-05-24 PROCEDURE — 6370000000 HC RX 637 (ALT 250 FOR IP): Performed by: INTERNAL MEDICINE

## 2021-05-24 PROCEDURE — 36415 COLL VENOUS BLD VENIPUNCTURE: CPT

## 2021-05-24 RX ORDER — DIPHENHYDRAMINE HCL 25 MG
25 TABLET ORAL PRN
Status: CANCELLED
Start: 2021-05-24

## 2021-05-24 RX ORDER — OXYCODONE HYDROCHLORIDE AND ACETAMINOPHEN 5; 325 MG/1; MG/1
1 TABLET ORAL EVERY 6 HOURS PRN
Qty: 20 TABLET | Refills: 0 | Status: SHIPPED | OUTPATIENT
Start: 2021-05-24 | End: 2021-05-29

## 2021-05-24 RX ORDER — HEPARIN SODIUM (PORCINE) LOCK FLUSH IV SOLN 100 UNIT/ML 100 UNIT/ML
300 SOLUTION INTRAVENOUS PRN
Status: CANCELLED | OUTPATIENT
Start: 2021-05-24

## 2021-05-24 RX ORDER — DIPHENHYDRAMINE HYDROCHLORIDE 50 MG/ML
50 INJECTION INTRAMUSCULAR; INTRAVENOUS ONCE
Status: CANCELLED
Start: 2021-05-24 | End: 2021-05-24

## 2021-05-24 RX ORDER — IBUPROFEN 400 MG/1
400 TABLET ORAL EVERY 8 HOURS PRN
Qty: 21 TABLET | Refills: 0 | Status: SHIPPED | OUTPATIENT
Start: 2021-05-24 | End: 2021-05-31

## 2021-05-24 RX ORDER — EPINEPHRINE 1 MG/ML
0.3 INJECTION, SOLUTION, CONCENTRATE INTRAVENOUS PRN
Status: CANCELLED
Start: 2021-05-24

## 2021-05-24 RX ORDER — SODIUM CHLORIDE 0.9 % (FLUSH) 0.9 %
5-40 SYRINGE (ML) INJECTION PRN
Status: CANCELLED | OUTPATIENT
Start: 2021-05-24

## 2021-05-24 RX ORDER — DIPHENHYDRAMINE HCL 25 MG
50 TABLET ORAL ONCE
Status: CANCELLED
Start: 2021-05-24 | End: 2021-05-24

## 2021-05-24 RX ADMIN — ENOXAPARIN SODIUM 40 MG: 40 INJECTION SUBCUTANEOUS at 10:15

## 2021-05-24 RX ADMIN — Medication 300 UNITS: at 16:29

## 2021-05-24 RX ADMIN — OXYCODONE HYDROCHLORIDE AND ACETAMINOPHEN 1 TABLET: 5; 325 TABLET ORAL at 03:40

## 2021-05-24 RX ADMIN — WATER 2000 MG: 1 INJECTION INTRAMUSCULAR; INTRAVENOUS; SUBCUTANEOUS at 16:09

## 2021-05-24 RX ADMIN — PANTOPRAZOLE SODIUM 40 MG: 40 TABLET, DELAYED RELEASE ORAL at 05:55

## 2021-05-24 ASSESSMENT — PAIN DESCRIPTION - LOCATION: LOCATION: BACK

## 2021-05-24 ASSESSMENT — PAIN SCALES - GENERAL: PAINLEVEL_OUTOF10: 4

## 2021-05-24 ASSESSMENT — PAIN DESCRIPTION - PROGRESSION: CLINICAL_PROGRESSION: GRADUALLY WORSENING

## 2021-05-24 ASSESSMENT — PAIN DESCRIPTION - DESCRIPTORS: DESCRIPTORS: ACHING

## 2021-05-24 NOTE — PROGRESS NOTES
Jonathan  Hospitalist   Progress Note    Admitting Date and Time: 5/18/2021  2:59 PM  Admit Dx: Uncontrolled pain [R52]    Subjective: Admitted on 18th, came with back pain, patient with stage I left invasive ductal carcinoma, s/p lumpectomy and XRT, on Arimidex, also has MGUS, did have chills, tobacco use in past, lucencies noted on CT pelvis, patient with Streptococcus bacteremia, issues with urinary retention, seen by urology, creatinine stable, no evidence of obstructive uropathy, De Jesus catheter continued, voiding trials when patient ambulatory, no further  interventions planned, positive for rhinovirus, seen by oncology, lucencies in pelvic bones are nonspecific, patient with stable recent monoclonal protein levels. Seen by ID, high-grade strep pneumoniae bacteremia, suspected right shoulder septic arthritis, blood cultures repeated, orthopedic surgery on consult for right shoulder arthrocentesis. Seen by orthopedics, attempt to aspirate subacromial space not successful. Effusion seems to be isolated to the glenohumeral joint. Patient is planned n.p.o. after midnight and surgery tomorrow morning. As per urology patient will need follow-up with her urologist in [facility] once she returns there. Patient had HUSSEIN, normal LV as well as LV function. No vegetations or valve disease. No thrombus. No intra atrial shunting. Patient did undergo I&D and debridement of right shoulder subacromial bursa, evidence of inflammatory type synovitis involving right shoulder capsule. ID has continued ceftriaxone. Also planned for PICC. SW working for home IV antibiotic therapy. As per orthopedics patient okay for ROM, sling as needed for comfort, avoid showers for 3 to 4 days, sutures out in 2 weeks. Patient had PICC line placed. Planned for 3 weeks of ceftriaxon. Seen by physical therapy, AM PAC 15/24. Patient okay for DC from ID standpoint.   Case management working on scheduling issues for the post discharge care. As of now patient is awake, alert, comfortable, sitting in chair, does say that she slept in the chair is getting out of bed is not comfortable. She also knows that likely she will be discharged later on today which she is happy with. She is also very very appreciative of the care in which all subspecialties were on the same page. We will continue with DC plans, later on today and she will be following with her PCP once back in [address]. Patient was admitted with Uncontrolled pain [R52]. Patient feels comfortable, at this time awake, alert, sitting in chair, does communicate well, does say that she cannot go home because does not have enough strength in the legs, wants to see physical therapy as well as social work, does understand that she is going to have antibiotics and arrangements are being done for the antibiotic delivery in the outpatient setting. Per RN: No over night complains. .    ROS: denies fever, chills, cp, sob, n/v, HA unless stated above.      lidocaine PF  5 mL Intradermal Once    sodium chloride flush  5-40 mL Intravenous 2 times per day    heparin flush  3 mL Intravenous 2 times per day    sodium chloride flush  5-40 mL Intravenous 2 times per day    lidocaine  10 mL Intra-articular Once    cefTRIAXone (ROCEPHIN) IV  2,000 mg Intravenous Q24H    pantoprazole  40 mg Oral QAM AC    enoxaparin  40 mg Subcutaneous Daily    lidocaine  1 patch Transdermal Daily     sodium chloride flush, 5-40 mL, PRN  sodium chloride, 25 mL, PRN  heparin flush, 3 mL, PRN  sodium chloride flush, 5-40 mL, PRN  sodium chloride, 25 mL, PRN  promethazine, 12.5 mg, Q6H PRN   Or  ondansetron, 4 mg, Q6H PRN  oxyCODONE, 5 mg, Q4H PRN  morphine, 2 mg, Q3H PRN  bisacodyl, 10 mg, Daily PRN  albuterol, 2.5 mg, Q6H PRN  polyethylene glycol, 17 g, Daily PRN  acetaminophen, 650 mg, Q6H PRN   Or  acetaminophen, 650 mg, Q6H PRN  ibuprofen, 400 mg, Q6H PRN  oxyCODONE-acetaminophen, 1 tablet, Q4H PRN  oxyCODONE-acetaminophen, 1 tablet, Q4H PRN         Objective:    BP (!) 140/79   Pulse 80   Temp 99.3 °F (37.4 °C) (Temporal)   Resp 17   Ht 5' 6\" (1.676 m)   Wt 160 lb (72.6 kg)   SpO2 95%   BMI 25.82 kg/m²   General Appearance: alert and oriented to person, place and time, well-developed and well-nourished, in no acute distress  Skin: warm and dry, no rash or erythema  Head: normocephalic and atraumatic  Eyes: pupils equal, round, and reactive to light, extraocular eye movements intact, conjunctivae normal  ENT: tympanic membrane, external ear and ear canal normal bilaterally, oropharynx clear and moist with normal mucous membranes  Neck: neck supple and non tender without mass, no thyromegaly or thyroid nodules, no cervical lymphadenopathy   Pulmonary/Chest: clear to auscultation bilaterally- no wheezes, rales or rhonchi, normal air movement, no respiratory distress  Cardiovascular: normal rate, normal S1 and S2, no gallops, intact distal pulses and no carotid bruits  Abdomen: soft, non-tender, non-distended, normal bowel sounds, no masses or organomegaly      Recent Labs     05/22/21  0618 05/23/21  0308 05/24/21  0330    139 136   K 3.7 3.3* 3.8    104 99   CO2 26 27 26   BUN 12 14 13   CREATININE 0.6 0.7 0.6   GLUCOSE 113* 90 106*   CALCIUM 9.8 9.6 10.0       No results for input(s): WBC, RBC, HGB, HCT, MCV, MCH, MCHC, RDW, PLT, MPV in the last 72 hours. CRP 27.5  ESR 70  CT right shoulder with synovial effusion, no erosive changes suggestive of osteomyelitis. No soft tissue mass or evidence of abscess. Echo with normal LV, no wall motion abnormality, normal EF, normal diastolic function. K3.3  IgA 27/total IgG 1907/IgM 18  Bone scan mostly showing DJD  Gram stain from arthrocentesis did not show any organisms.     Radiology:   NM BONE SCAN WHOLE BODY   Final Result   While there is subtle increased activity along the right sacroiliac joint, no   significant activity is otherwise seen to correspond to the multiple   bilateral lucent pelvic lesions on CT. This does not exclude malignancy as   certain malignancies such as multiple myeloma may be occult on bone scan. Correlation with SPEP and UPEP could be considered. Multifocal degenerative changes are otherwise favored as outlined above. CT SHOULDER RIGHT W WO CONTRAST   Final Result   1. Moderate right shoulder synovial effusion. 2.  No bony erosive changes to suggest osteomyelitis. 3.  No soft tissue mass or evidence of abscess. CT ABDOMEN PELVIS WO CONTRAST Additional Contrast? None   Final Result   1. No acute abnormality is seen in the abdomen or the pelvis. 2. No urolithiasis or hydronephrosis   3. 1.5 probable complex cyst in the right kidney. Sonographic evaluation   recommended. 4. Subtle lucencies in the pelvic bones are nonspecific and of questionable   significance. Possibility metastatic disease or multiple myeloma should be   considered. Clinical correlation is needed. Assessment:    Active Problems:    Uncontrolled pain    Intractable back pain  Resolved Problems:    * No resolved hospital problems. *      Plan:  1. Constipation, patient had a suppository, could be related to the use of pain medications, will add MiraLAX as well as senna. Also Reglan in low doses. 2. Strep bacteremia, elevated procalcitonin, increased CRP, patient on ceftriaxone day 3, ID on board, final sensitivity pending. Patient improving on Ceftriaxone. planned for PICC. 3. Rhinovirus infection, patient remains on supportive care as well as isolation, no change. 4. Patient with known ductal carcinoma of breast, also MUGS, radiolucencies on pelvic CTdue as per oncology nonspecific, bone scan negative. 5. Suspected right shoulder septic arthritis, patient did undergo incision and drainage after negative HUSSEIN. 6. Electrolyte imbalance, hypokalemia, supplement. improved.   7. DVT prophylaxis with Lovenox. 8. DC planning once arrangements done for home antibiotics as well as from PT side and sub specialities. most likely by tomorrow once more involvement from physical therapy and social work available.         Electronically signed by Genna Brock MD on 5/24/2021 at 7:49 AM

## 2021-05-24 NOTE — PLAN OF CARE
Problem: Falls - Risk of:  Goal: Will remain free from falls  Description: Will remain free from falls  5/24/2021 1650 by Fabio Gallagher RN  Outcome: Met This Shift     Problem: Falls - Risk of:  Goal: Absence of physical injury  Description: Absence of physical injury  5/24/2021 1650 by Fabio Gallagher RN  Outcome: Met This Shift     Problem: Pain:  Goal: Pain level will decrease  Description: Pain level will decrease  5/24/2021 1650 by Fabio Gallagher RN  Outcome: Met This Shift     Problem: Skin Integrity:  Goal: Will show no infection signs and symptoms  Description: Will show no infection signs and symptoms  5/24/2021 1650 by Fabio Gallagher RN  Outcome: Met This Shift

## 2021-05-24 NOTE — PROGRESS NOTES
Occupational Therapy  OT BEDSIDE TREATMENT NOTE      Date:2021  Patient Name: Kvng Garcia  MRN: [id number]  : 1960  Room: [address] [place] [address]             Date:2021                                                  Patient Name: Kvng Garcia    MRN: [id number]    : 1960    Room: [address] [place] [address]       Evaluating OT: Ramonita Edward OTR/L   [id number]       Referring Provider:Chato Astorga DO     Specific Provider Orders/Date:OT eval and treat 2021       Diagnosis: uncontrolled pain  Presented to ED on 2021   for sudden onset aching, sharp, shooting, stabbing pain in the right flank without radiation which began a few day    Septic arthritis/effusion of the right shoulder    Surgery: ARTHROSCOPIC INCISION AND DRAINAGE RIGHT SHOULDER WITH DEBRIDEMENT OF SUBACROMIAL BURSA  2021    Pertinent Medical History: asthma, [place]      Precautions:  Fall Risk, Droplet isolation per ortho note: Patient is OK for ROM, activity as tolerated  May use sling prn for comfort     Would avoid showers for 3-4 days      Assessment of current deficits    []? Functional mobility            [x]?ADLs           [x]? Strength                  [x]? Cognition    [x]? Functional transfers          [x]? IADLs         [x]? Safety Awareness   [x]? Endurance    [x]? Fine Coordination                         [x]? Balance      []? Vision/perception   [x]? Sensation      []? Gross Motor Coordination             [x]? ROM           []? Delirium                   []? Motor Control      OT PLAN OF CARE   OT POC based on physician orders, patient diagnosis and results of clinical assessment     Frequency/Duration  2-4days/wk for 2 weeks PRN   Specific OT Treatment Interventions to include:   [x]? ?ADL retraining/adapted techniques and AE recommendations to increase functional independence within precautions                    [x]? ? Energy conservation techniques to improve tolerance for selfcare routine   [x]? ? Functional transfer/mobility training/DME recommendations for increased independence, safety and fall prevention         [x]? ?Patient/family education to increase safety and functional independence             [x]? ? Environmental modifications for safe mobility and completion of ADLs                             [x]? ? Therapeutic activity to improve functional performance during ADLs.                                         [x]? ? Therapeutic exercise to improve tolerance and functional strength for ADLs   [x]? ? Balance retraining/tolerance tasks for facilitation of postural control with dynamic challenges during ADLs .   [x]? Positioning to improve functional independence  []? ? Cognitive retraining ex's to improve problem solving skills & safe participation in ADLs/IADLs     []? ?Sensory re-education techniques to improve extremity awareness, maintain skin integrity and improve hand function                             []? ? Visual/Perceptual retraining  to improve body awareness and safety during transfers and ADLs  []? ? Splinting/positioning needs to maintain joint/skin integrity and prevent contractures.  []?? Neuromuscular re-education: facilitation of righting/equilibrium reactions, midline orientation, scapular stability/mobility, Normalization muscle     tone and facilitation active functional movement/Attention                         []? ? Delirium prevention/treatment  []? ? Other     Recommended Adaptive Equipment: walker      Home Living: Pt lives in [place], here visiting her elderly parents. Parents have a  2 story with 1/2 bath on 1st recliner on 1st floor she can sleep in.   3 steps/rail   Bed/bath on 2nd.      Bathroom setup: walk in shower in basement, tub only 2nd floor   (In [place] lives with  in 1 story)     Prior Level of Function: Independent  with ADLs , Independent  with IADLs; ambulated with no device   Driving: yes   Occupation: employed with [employer] works from home      Pain Level: Pt reports shoulder and back pain with movement. Cognition: Alert and alert. Functional Assessment:  AM-PAC Daily Activity Raw Score: 16/24    Initial Eval Status  Date: 5/23/21 Treatment Status   STGs = LTGs  Time frame: 10-14 days   Feeding Set-up  Pt feeding herself with L hand due to limited R shoulder movement.   Independent    Grooming SBA/set-up   Standing at sink with wash hygiene    Mod I    UB Dressing Mod  A Min A   Mod I    LB Dressing Mod A   Able to initiate crossing leg to adjust socks  Mod A   Mod I    Bathing Mod A    Mod I    Toileting Assist with thorough hygiene Pt states she is completing hygiene with L hand. Denied need for assist.       Bed Mobility  Up in chair upon entry         Functional Transfers Min A  Sit-stand from chair    Mod I    Functional Mobility CGA,w/walker  Ambulating in room    Mod I with good tolerance    Balance Sitting:     Static:  Independent     Dynamic:SBA   Standing: CGA/SBA        Activity Tolerance Fair  With light activity  Fair   Good  with ADL activity          R UE  Decrease R UE ROM,   Increase pain   instruction of R UE ROM exercises while seated in the chair. Pt completed shoulder shrugs, shoulder rolls, and scapular retraction. Pt also completed AROM with table top stretches for shoulder flexion and abduction. AROM to distal UE. Tolerate UE ROM and strengthening exercises to improve AROM for ADLS       Comments:  Limited tolerance for R shoulder ROM and limited ADL ability due to limited movement. Pt instructed with exercises and provided with handouts for follow through at home. Pt with no further questions at this time. Education/treatment:  ADL retraining with instruction of compensatory strategies and facilitation of movement to increase self care skills. Therapeutic exercise to address ROM and strength for increased ability to complete ADL. Pt education of HEP, positioning, and energy conservation. · Pt has made  progress towards set goals.    ·     Time In: 9:20   Time Out: 10:08      Min Units   Therapeutic Ex 39520 30 2   Therapeutic Activities [id number]     ADL/Self Care [id number] 18 1   Orthotic Management [id number]     Neuro Re-Ed [id number]     Non-Billable Time     TOTAL TIMED TREATMENT 48 1239 [facility] EVE/L [id number]

## 2021-05-24 NOTE — PLAN OF CARE
Problem: Falls - Risk of:  Goal: Will remain free from falls  Description: Will remain free from falls  5/24/2021 0415 by Kate Gurrola RN  Outcome: Met This Shift     Problem: Falls - Risk of:  Goal: Absence of physical injury  Description: Absence of physical injury  5/24/2021 0415 by Kate Gurrola RN  Outcome: Met This Shift     Problem: Pain:  Goal: Pain level will decrease  Description: Pain level will decrease  5/24/2021 0415 by Kate Gurrola RN  Outcome: Met This Shift     Problem: Pain:  Goal: Control of acute pain  Description: Control of acute pain  5/24/2021 0415 by Kate Gurrola RN  Outcome: Met This Shift     Problem: Skin Integrity:  Goal: Will show no infection signs and symptoms  Description: Will show no infection signs and symptoms  5/24/2021 0415 by Kate Gurrola RN  Outcome: Met This Shift

## 2021-05-24 NOTE — DISCHARGE SUMMARY
[facility] EMERGENCY SERVICE Physician Discharge Summary       Mono Solano, [address] [address]  [facility] [address] [address] [phone]    Schedule an appointment as soon as possible for a visit in 1 week  For follow-up      Activity level: As tolerated    Diet: DIET GENERAL;    Labs:    Dispo: Home with home health    Condition at discharge: Stable    Continue supplemental oxygen via nasal canula @ 2 LPM round-the-clock. Continue CPAP / BiPAP during sleep as prior to admission. Patient ID:  Kaitlin Godinez  [id number]  20 y.o.  1960    Admit date: 5/18/2021    Discharge date and time:  5/24/2021  11:41 AM    Admission Diagnoses: Active Problems:    Uncontrolled pain    Intractable back pain  Resolved Problems:    * No resolved hospital problems. *      Discharge Diagnoses: Active Problems:    Uncontrolled pain    Intractable back pain  Resolved Problems:    * No resolved hospital problems. *      Consults:  IP CONSULT TO ONCOLOGY  IP CONSULT TO UROLOGY  IP CONSULT TO INFECTIOUS DISEASES  IP CONSULT TO IV TEAM  IP CONSULT TO IV TEAM  IP CONSULT TO ORTHOPEDIC SURGERY  IP CONSULT TO IV TEAM  IP CONSULT TO IV TEAM  IP CONSULT TO IV TEAM  IP CONSULT TO IV TEAM  IP CONSULT TO IV TEAM  IP CONSULT TO CASE MANAGEMENT    Procedures: Incision and drainage right shoulder    Hospital Course: Patient was admitted with Uncontrolled pain [R52].  Patient Admitted on 18th, came with back pain, patient with stage I left invasive ductal carcinoma, s/p lumpectomy and XRT, on Arimidex, also has MGUS, did have chills, tobacco use in past, lucencies noted on CT pelvis, patient with Streptococcus bacteremia, issues with urinary retention, seen by urology, creatinine stable, no evidence of obstructive uropathy, De Jesus catheter continued, voiding trials when patient ambulatory, no further  interventions planned, positive for rhinovirus, seen by oncology, lucencies in pelvic bones are nonspecific, patient with stable recent monoclonal protein levels. Seen by ID, high-grade strep pneumoniae bacteremia, suspected right shoulder septic arthritis, blood cultures repeated, orthopedic surgery on consult for right shoulder arthrocentesis. Seen by orthopedics, attempt to aspirate subacromial space not successful. Effusion seems to be isolated to the glenohumeral joint. Patient is planned n.p.o. after midnight and surgery tomorrow morning. As per urology patient will need follow-up with her urologist in [facility] once she returns there. Patient had HUSSEIN, normal LV as well as LV function. No vegetations or valve disease. No thrombus. No intra atrial shunting. Patient did undergo I&D and debridement of right shoulder subacromial bursa, evidence of inflammatory type synovitis involving right shoulder capsule. ID has continued ceftriaxone. Also planned for PICC. SW working for home IV antibiotic therapy. As per orthopedics patient okay for ROM, sling as needed for comfort, avoid showers for 3 to 4 days, sutures out in 2 weeks. Patient had PICC line placed. Planned for 3 weeks of ceftriaxon. Seen by physical therapy, AM PAC 15/24. Patient okay for DC from ID standpoint. Case management working on scheduling issues for the post discharge care. As of now patient is awake, alert, comfortable, sitting in chair, does say that she slept in the chair is getting out of bed is not comfortable. She also knows that likely she will be discharged later on today which she is happy with. She is also very very appreciative of the care in which all subspecialties were on the same page. We will continue with DC plans, later on today and she will be following with her PCP once back in [address]     Discharge Exam:  Vitals:    05/23/21 1615 05/23/21 2015 05/24/21 0045 05/24/21 0320   BP: (!) 144/74 135/82 124/76 (!) 140/79   Pulse: 69 70 70 80   Resp: 16 16 16 17   Temp: 97.2 °F (36.2 °C) 98.2 °F (36.8 °C) 98.3 °F (36.8 °C) 99.3 °F (37.4 °C)   TempSrc: Temporal Oral Oral Temporal   SpO2:  98% 99% 95%   Weight:       Height:           General Appearance: alert and oriented to person, place and time, well-developed and well-nourished, in no acute distress  Skin: warm and dry, no rash or erythema  Head: normocephalic and atraumatic  Eyes: pupils equal, round, and reactive to light, extraocular eye movements intact, conjunctivae normal  ENT: tympanic membrane, external ear and ear canal normal bilaterally, oropharynx clear and moist with normal mucous membranes  Neck: neck supple and non tender without mass, no thyromegaly or thyroid nodules, no cervical lymphadenopathy   Pulmonary/Chest: clear to auscultation bilaterally- no wheezes, rales or rhonchi, normal air movement, no respiratory distress  Cardiovascular: normal rate, normal S1 and S2, no gallops, intact distal pulses and no carotid bruits  Abdomen: soft, non-tender, non-distended, normal bowel sounds, no masses or organomegaly  I/O last 3 completed shifts: In: 800 [P.O.:800]  Out: 850 [Urine:850]  I/O this shift:  In: 240 [P.O.:240]  Out: 350 [Urine:350]      LABS:  Recent Labs     05/22/21  0618 05/23/21  0308 05/24/21  0330    139 136   K 3.7 3.3* 3.8    104 99   CO2 26 27 26   BUN 12 14 13   CREATININE 0.6 0.7 0.6   GLUCOSE 113* 90 106*   CALCIUM 9.8 9.6 10.0       No results for input(s): WBC, RBC, HGB, HCT, MCV, MCH, MCHC, RDW, PLT, MPV in the last 72 hours. Imaging:   NM BONE SCAN WHOLE BODY   Final Result   While there is subtle increased activity along the right sacroiliac joint, no   significant activity is otherwise seen to correspond to the multiple   bilateral lucent pelvic lesions on CT. This does not exclude malignancy as   certain malignancies such as multiple myeloma may be occult on bone scan. Correlation with SPEP and UPEP could be considered. Multifocal degenerative changes are otherwise favored as outlined above. CT SHOULDER RIGHT W WO CONTRAST   Final Result   1. Moderate right shoulder synovial effusion. 2.  No bony erosive changes to suggest osteomyelitis. 3.  No soft tissue mass or evidence of abscess. CT ABDOMEN PELVIS WO CONTRAST Additional Contrast? None   Final Result   1. No acute abnormality is seen in the abdomen or the pelvis. 2. No urolithiasis or hydronephrosis   3. 1.5 probable complex cyst in the right kidney. Sonographic evaluation   recommended. 4. Subtle lucencies in the pelvic bones are nonspecific and of questionable   significance. Possibility metastatic disease or multiple myeloma should be   considered. Clinical correlation is needed. Patient Instructions:      Medication List      START taking these medications    cefTRIAXone  infusion  Commonly known as: ROCEPHIN  Infuse 2,000 mg intravenously every 24 hours for 21 days Compound per protocol     ibuprofen 400 MG tablet  Commonly known as: ADVIL;MOTRIN  Take 1 tablet by mouth every 8 hours as needed for Pain     oxyCODONE-acetaminophen 5-325 MG per tablet  Commonly known as: PERCOCET  Take 1 tablet by mouth every 6 hours as needed for Pain for up to 5 days.         CONTINUE taking these medications    ALPRAZolam 0.25 MG tablet  Commonly known as: XANAX     Breo Ellipta 100-25 MCG/INH Aepb inhaler  Generic drug: fluticasone-vilanterol     Claritin 10 MG capsule  Generic drug: loratadine     fluticasone 50 MCG/ACT nasal spray  Commonly known as: FLONASE     omeprazole 10 MG delayed release capsule  Commonly known as: PRILOSEC     ProAir  (90 Base) MCG/ACT inhaler  Generic drug: albuterol sulfate HFA     vitamin D 25 MCG (1000 UT) Caps           Where to Get Your Medications      These medications were sent to [address] [place], [place] [address]  [facility], [address] [facility]    Phone: [phone]   · ibuprofen 400 MG tablet     You can get these medications from any pharmacy    Bring a paper prescription for each of these medications  · cefTRIAXone  infusion  · oxyCODONE-acetaminophen 5-325 MG per tablet           Note that more than 30 minutes was spent in preparing discharge papers, discussing discharge with patient, medication review, etc.    Signed:  Electronically signed by Kera Dan MD on 5/24/2021 at 11:41 AM    NOTE: This report was transcribed using voice recognition software. Every effort was made to ensure accuracy; however, inadvertent computerized transcription errors may be present.

## 2021-05-24 NOTE — PROGRESS NOTES
[facility]  Hematology/Oncology  Consult      Patient Name: Digna Putnam  YOB: 1960  PCP: No primary care provider on file. Referring Provider:      Reason for Consultation:   Chief Complaint   Patient presents with    Flank Pain     Right sided- started last night- hx of kidney stone      Subjective: Reports pain is no better. Continues with back and right shoulder pain. History of Present Illness: This is a 62-year-old female patient who follows with [facility] oncology in [place] for history of left breast invasive ductal carcinoma. She was stage I ER/MN positive HER-2/yu negative by FISH. She is status post a left breast lumpectomy in 2014. She completed MammoSite radiation in 2015 and adjuvant Arimidex finished in February 2020. She was also following for monoclonal gammopathy of unknown significance at that time. She has had a stable M spike between 1.2 and 1.5 since 2015. Her most recent monoclonal protein levels were stable. She presented to the emergency room for severe back pain making it difficult to ambulate. CTAP showed a 1.5 probable complex cyst in the right kidney. Subtle lucencies in the pelvic bones are nonspecific and of questionable significance. Blood cultures were positive. Consultation for further recommendations and evaluation of lucencies on CT AP with a history of breast CA and MGUS.         Diagnostic Data:     Past Medical History:   Diagnosis Date    Asthma     Cancer [facility])     Kidney stones        Patient Active Problem List    Diagnosis Date Noted    Septic arthritis of shoulder, right ([address] 05/24/2021    Intractable back pain     Uncontrolled pain 05/18/2021        Past Surgical History:   Procedure Laterality Date    BREAST SURGERY      L lumpectomy     INCISION AND DRAINAGE Right 5/21/2021    ARTHROSCOPIC INCISION AND DRAINAGE RIGHT SHOULDER WITH DEBRIDEMENT OF SUBACROMIAL BURSA performed by Angeline Daniels DO at [facility] LINE INSERTION NURSE  5/22/2021            Family History  History reviewed. No pertinent family history. Social History    TOBACCO:   reports that she quit smoking about 44 years ago. Her smoking use included cigarettes. She started smoking about 46 years ago. She smoked 0.25 packs per day. She has never used smokeless tobacco.  ETOH:   reports previous alcohol use. Home Medications  Prior to Admission medications    Medication Sig Start Date End Date Taking? Authorizing Provider   ibuprofen (ADVIL;MOTRIN) 400 MG tablet Take 1 tablet by mouth every 8 hours as needed for Pain 5/24/21 5/31/21 Yes Princess Alberto MD   oxyCODONE-acetaminophen (PERCOCET) 5-325 MG per tablet Take 1 tablet by mouth every 6 hours as needed for Pain for up to 5 days. 5/24/21 5/29/21 Yes Princess Alberto MD   cefTRIAXone (ROCEPHIN) infusion Infuse 2,000 mg intravenously every 24 hours for 21 days Compound per protocol 5/22/21 6/12/21 Yes Neisha Cuevas MD   omeprazole (PRILOSEC) 10 MG delayed release capsule Take 20 mg by mouth every other day    Yes Historical Provider, MD   fluticasone-vilanterol (BREO ELLIPTA) 100-25 MCG/INH AEPB inhaler Inhale 1 puff into the lungs daily   Yes Historical Provider, MD   albuterol sulfate HFA (PROAIR HFA) 108 (90 Base) MCG/ACT inhaler Inhale 2 puffs into the lungs every 6 hours as needed for Wheezing   Yes Historical Provider, MD   fluticasone (FLONASE) 50 MCG/ACT nasal spray 1 spray by Each Nostril route daily as needed for Rhinitis   Yes Historical Provider, MD   loratadine (CLARITIN) 10 MG capsule Take 20 mg by mouth daily   Yes Historical Provider, MD   vitamin D 25 MCG (1000 UT) CAPS Take 1,000 Units by mouth daily   Yes Historical Provider, MD   ALPRAZolam (XANAX) 0.25 MG tablet Take 0.25 mg by mouth 3 times daily as needed for Sleep.    Yes Historical Provider, MD       Allergies  Allergies   Allergen Reactions    Adhesive Tape FERRITIN        Radiology-    CT ABDOMEN PELVIS WO CONTRAST Additional Contrast? None    Result Date: 5/18/2021  EXAMINATION: CT OF THE ABDOMEN AND PELVIS WITHOUT CONTRAST 5/18/2021 4:43 pm TECHNIQUE: CT of the abdomen and pelvis was performed without the administration of intravenous contrast. Multiplanar reformatted images are provided for review. Dose modulation, iterative reconstruction, and/or weight based adjustment of the mA/kV was utilized to reduce the radiation dose to as low as reasonably achievable. COMPARISON: None. HISTORY: ORDERING SYSTEM PROVIDED HISTORY: right flank pain, hx kidney stones TECHNOLOGIST PROVIDED HISTORY: Reason for exam:->right flank pain, hx kidney stones Additional Contrast?->None Decision Support Exception - unselect if not a suspected or confirmed emergency medical condition->Emergency Medical Condition ([place]) FINDINGS: Lower Chest: The lung bases are clear. The heart is normal in size. No pleural or pericardial effusion. Organs: Liver: Unremarkable. Gallbladder: Unremarkable. Pancreas: Unremarkable. Spleen:  Unremarkable. Adrenals: Unremarkable. Kidneys: Normal in size and contour. 1.4 cm hypodense lesion along the midpole is fatty and consistent with an angiomyolipoma. No stone or hydronephrosis. 1.5 cm mildly hypodense lesion along the upper pole of the right kidney (series 2, image 48) is nonspecific. It may represent a complex cyst. GI/Bowel: No bowel wall thickening or obstruction. Normal appendix. Pelvis: The urinary bladder is unremarkable. Within limits of the CT technique, the uterus and the adnexa are grossly unremarkable. Peritoneum/Retroperitoneum: No lymphadenopathy. No free air or free fluid is seen. The abdominal aorta and pelvic arteries are unremarkable. Bones/Soft Tissues:  No fracture or joint dislocation is seen. Subtle lucencies in the pelvic bones are nonspecific. 1.  No acute abnormality is seen in the abdomen or the pelvis.  2. No urolithiasis week upon discharge. Breast cancer tumor markers were within normal range.       Syl Rincon MD  Electronically signed 5/24/2021 at 3:51 PM

## 2021-05-24 NOTE — CARE COORDINATION
Social Work:    Follow-up visit was made today with Mitchel John who requested O.P. Infusion center & O.P. rehab at the [facility], as well as a wheeled walker & 3-1 commode. Choices for DME were provided and Mitchel John has no preference. Mitchel John advised that she plans to be in town with her parents another week and then will need care transferred to [facility] [phone] [address] [facility], [address], as Mitchel John resides in [place]. Mitchel John advised that her PCP is Dr. Michael Carballo in [address]. This information was given to the O.P. infusion center along with her prescription. O.P. therapy at the [facility] advised that they can take patient 8:30 a.m. Mitchel John agreed. Social work to fax orders once received. A referral was called/faxed to [address] [facility] for discharge home today. Electronically signed by GREG Tom on 5/24/2021 at 10:39 AM     The Plan for Transition of Care is related to the following treatment goals: SNF, HHC, DME    The Patient and/or patient representative John Toabr was provided with a choice of provider and agrees with the discharge plan. [x] Yes [] No    Freedom of choice list was provided with basic dialogue that supports the patient's individualized plan of care/goals, treatment preferences and shares the quality data associated with the providers.  [x] Yes [] No    Electronically signed by GREG Tom on 5/24/2021 at 10:39 AM

## 2021-05-24 NOTE — PROGRESS NOTES
[facility]  JEANAIDA  Progress Note    SUBJECTIVE:  Chief Complaint   Patient presents with    Flank Pain     Right sided- started last night- hx of kidney stone     Patient is tolerating medications. No issues. Pain is controlled. No fevers, nausea, vomiting, diarrhea. Review of systems:  As stated above in the chief complaint, otherwise negative. Medications:  Scheduled Meds:   lidocaine PF  5 mL Intradermal Once    sodium chloride flush  5-40 mL Intravenous 2 times per day    heparin flush  3 mL Intravenous 2 times per day    sodium chloride flush  5-40 mL Intravenous 2 times per day    lidocaine  10 mL Intra-articular Once    cefTRIAXone (ROCEPHIN) IV  2,000 mg Intravenous Q24H    pantoprazole  40 mg Oral QAM AC    enoxaparin  40 mg Subcutaneous Daily    lidocaine  1 patch Transdermal Daily     Continuous Infusions:   sodium chloride      sodium chloride       PRN Meds:sodium chloride flush, sodium chloride, heparin flush, sodium chloride flush, sodium chloride, promethazine **OR** ondansetron, oxyCODONE, morphine, bisacodyl, albuterol, polyethylene glycol, acetaminophen **OR** acetaminophen, ibuprofen, oxyCODONE-acetaminophen, oxyCODONE-acetaminophen    OBJECTIVE:  BP (!) 140/79   Pulse 80   Temp 99.3 °F (37.4 °C) (Temporal)   Resp 17   Ht 5' 6\" (1.676 m)   Wt 160 lb (72.6 kg)   SpO2 95%   BMI 25.82 kg/m²   Temp  Av.9 °F (36.6 °C)  Min: 96.6 °F (35.9 °C)  Max: 99.3 °F (37.4 °C)  Constitutional: The patient is awake, alert, and oriented. No distress. Up to chair. Skin: Warm and dry. No rashes were noted. HEENT: Round and reactive pupils. Moist mucous membranes. No ulcerations or thrush. Neck: Supple to movements. Chest: No respiratory distress. Symmetrical expansion. No wheezing, crackles or rhonchi. Clear bialterally. Cardiovascular: S1 and S2 are rhythmic and regular. No murmurs appreciated.    Abdomen: Positive bowel sounds to auscultation. Benign to palpation. No masses felt. Extremities: No edema. Right shoulder with limited ROM, feeling stiff. Sutures are well approximated   Lines: peripheral  PICC line left arm 5/22/2021     Laboratory and Tests Review:  Lab Results   Component Value Date    WBC 3.6 (L) 05/20/2021    WBC 5.7 05/18/2021    HGB 11.8 05/20/2021    HCT 33.7 (L) 05/20/2021    MCV 98.3 05/20/2021     05/20/2021     Lab Results   Component Value Date    NEUTROABS 5.35 05/18/2021     No results found for: CRP  Lab Results   Component Value Date    ALT 39 (H) 05/18/2021    AST 53 (H) 05/18/2021    ALKPHOS 55 05/18/2021    BILITOT 1.3 (H) 05/18/2021     Lab Results   Component Value Date     05/24/2021    K 3.8 05/24/2021    CL 99 05/24/2021    CO2 26 05/24/2021    BUN 13 05/24/2021    CREATININE 0.6 05/24/2021    CREATININE 0.7 05/23/2021    CREATININE 0.6 05/22/2021    GFRAA >60 05/24/2021    LABGLOM >60 05/24/2021    GLUCOSE 106 05/24/2021    PROT 6.8 05/18/2021    LABALBU 3.1 05/18/2021    CALCIUM 10.0 05/24/2021    BILITOT 1.3 05/18/2021    ALKPHOS 55 05/18/2021    AST 53 05/18/2021    ALT 39 05/18/2021     Lab Results   Component Value Date    CRP 27.5 (H) 05/19/2021     Lab Results   Component Value Date    SEDRATE 70 (H) 05/19/2021     Radiology:  Noted     Microbiology:   5/18/2021-blood culture- Gram positive cocci in chains - strep pneumoniae   5/19/2021- blood cx- gram positive cocci in chains   5/20/2021 - blood culture - negative so far  Strep pneumoniae antigen- positive   5/21/2021 - surgical cultures - no organisms seen [asked micro to hold specimen]     Respiratory panel- ++ enterovirus/ Rhinovirus       ASSESSMENT:  · High grade Strep Pneumoniae Bacteremia. · Status post HUSSEIN - no mention of vegetations   · Right septic shoulder arthritis. Status post arthroscopic incision and drainage right shoulder with debridement of subacromial bursa.   · Right pelvic pain  · Leukopenia   · Rhinovirus/ enterovirus   · History of breast cancer     PLAN:  · Continue Ceftraxione for 3 weeks total - reconciled  · Check final cultures - asked Micro to hold surgical cultures   · Monitor labs  · Ok to discharge from ID standpoint once outpatient infusions are set up     JESSA Tena CNP  10:00 AM  5/24/2021     Patient seen and examined. I had a face to face encounter with the patient. Agree with exam.  Assessment and plan as outlined above and directed by me. Addition and corrections were done as deemed appropriate. My exam and plan include: Patient is sitting up in a chair. He is trying to exercise for right shoulder by moving her wrist.  Tolerating antibiotic. She is set up to go to the infusion center until Sunday. After that she will resume antibiotics in [facility].   She can be discharged from AMBER Gatica MD  5/24/2021  2:56 PM

## 2021-05-25 ENCOUNTER — HOSPITAL ENCOUNTER (OUTPATIENT)
Dept: INFUSION THERAPY | Age: 61
Setting detail: INFUSION SERIES
Discharge: HOME | End: 2021-05-25
Payer: COMMERCIAL

## 2021-05-25 ENCOUNTER — HOSPITAL ENCOUNTER (OUTPATIENT)
Dept: PHYSICAL THERAPY | Age: 61
Setting detail: THERAPIES SERIES
Discharge: HOME | End: 2021-05-25
Payer: COMMERCIAL

## 2021-05-25 VITALS
TEMPERATURE: 98 F | SYSTOLIC BLOOD PRESSURE: 126 MMHG | DIASTOLIC BLOOD PRESSURE: 77 MMHG | HEART RATE: 83 BPM | RESPIRATION RATE: 16 BRPM | OXYGEN SATURATION: 100 % | HEIGHT: 66 IN | BODY MASS INDEX: 25.71 KG/M2 | WEIGHT: 160 LBS

## 2021-05-25 DIAGNOSIS — M00.9 PYOGENIC ARTHRITIS OF RIGHT SHOULDER REGION, DUE TO UNSPECIFIED ORGANISM (HCC): Primary | ICD-10-CM

## 2021-05-25 LAB
ALBUMIN SERPL-MCNC: 3.2 G/DL (ref 3.5–5.2)
ALP BLD-CCNC: 253 U/L (ref 35–104)
ALT SERPL-CCNC: 352 U/L (ref 0–32)
ANION GAP SERPL CALCULATED.3IONS-SCNC: 11 MMOL/L (ref 7–16)
AST SERPL-CCNC: 193 U/L (ref 0–31)
ATYPICAL LYMPHOCYTE RELATIVE PERCENT: 1.7 % (ref 0–4)
BASOPHILS ABSOLUTE: 0 E9/L (ref 0–0.2)
BASOPHILS RELATIVE PERCENT: 0 % (ref 0–2)
BILIRUB SERPL-MCNC: 1 MG/DL (ref 0–1.2)
BLOOD CULTURE, ROUTINE: NORMAL
BUN BLDV-MCNC: 12 MG/DL (ref 6–23)
C-REACTIVE PROTEIN: 21.2 MG/DL (ref 0–0.4)
CALCIUM SERPL-MCNC: 9.8 MG/DL (ref 8.6–10.2)
CHLORIDE BLD-SCNC: 100 MMOL/L (ref 98–107)
CO2: 26 MMOL/L (ref 22–29)
CREAT SERPL-MCNC: 0.8 MG/DL (ref 0.5–1)
CULTURE, BLOOD 2: NORMAL
EOSINOPHILS ABSOLUTE: 0 E9/L (ref 0.05–0.5)
EOSINOPHILS RELATIVE PERCENT: 0 % (ref 0–6)
GFR AFRICAN AMERICAN: >60
GFR NON-AFRICAN AMERICAN: >60 ML/MIN/1.73
GLUCOSE BLD-MCNC: 98 MG/DL (ref 74–99)
HCT VFR BLD CALC: 30 % (ref 34–48)
HEMOGLOBIN: 10.2 G/DL (ref 11.5–15.5)
HYPOCHROMIA: ABNORMAL
LYMPHOCYTES ABSOLUTE: 1.5 E9/L (ref 1.5–4)
LYMPHOCYTES RELATIVE PERCENT: 13.1 % (ref 20–42)
MCH RBC QN AUTO: 33.8 PG (ref 26–35)
MCHC RBC AUTO-ENTMCNC: 34 % (ref 32–34.5)
MCV RBC AUTO: 99.3 FL (ref 80–99.9)
METAMYELOCYTES RELATIVE PERCENT: 1.7 % (ref 0–1)
MONOCYTES ABSOLUTE: 0.9 E9/L (ref 0.1–0.95)
MONOCYTES RELATIVE PERCENT: 8.7 % (ref 2–12)
NEUTROPHILS ABSOLUTE: 7.7 E9/L (ref 1.8–7.3)
NEUTROPHILS RELATIVE PERCENT: 74.8 % (ref 43–80)
NUCLEATED RED BLOOD CELLS: 0 /100 WBC
PDW BLD-RTO: 14.6 FL (ref 11.5–15)
PLATELET # BLD: 356 E9/L (ref 130–450)
PMV BLD AUTO: 10.3 FL (ref 7–12)
POLYCHROMASIA: ABNORMAL
POTASSIUM SERPL-SCNC: 3.4 MMOL/L (ref 3.5–5)
RBC # BLD: 3.02 E12/L (ref 3.5–5.5)
SEDIMENTATION RATE, ERYTHROCYTE: 126 MM/HR (ref 0–20)
SODIUM BLD-SCNC: 137 MMOL/L (ref 132–146)
TOTAL PROTEIN: 7.8 G/DL (ref 6.4–8.3)
WBC # BLD: 10 E9/L (ref 4.5–11.5)

## 2021-05-25 PROCEDURE — 96374 THER/PROPH/DIAG INJ IV PUSH: CPT

## 2021-05-25 PROCEDURE — G0283 ELEC STIM OTHER THAN WOUND: HCPCS

## 2021-05-25 PROCEDURE — 85651 RBC SED RATE NONAUTOMATED: CPT

## 2021-05-25 PROCEDURE — 6360000002 HC RX W HCPCS: Performed by: SPECIALIST

## 2021-05-25 PROCEDURE — 80053 COMPREHEN METABOLIC PANEL: CPT

## 2021-05-25 PROCEDURE — 85025 COMPLETE CBC W/AUTO DIFF WBC: CPT

## 2021-05-25 PROCEDURE — 86140 C-REACTIVE PROTEIN: CPT

## 2021-05-25 PROCEDURE — 97161 PT EVAL LOW COMPLEX 20 MIN: CPT

## 2021-05-25 PROCEDURE — 2580000003 HC RX 258: Performed by: SPECIALIST

## 2021-05-25 RX ORDER — DIPHENHYDRAMINE HYDROCHLORIDE 50 MG/ML
50 INJECTION INTRAMUSCULAR; INTRAVENOUS ONCE
Status: CANCELLED
Start: 2021-05-26 | End: 2021-05-26

## 2021-05-25 RX ORDER — DIPHENHYDRAMINE HCL 25 MG
50 TABLET ORAL ONCE
Status: CANCELLED
Start: 2021-05-26 | End: 2021-05-26

## 2021-05-25 RX ORDER — HEPARIN SODIUM (PORCINE) LOCK FLUSH IV SOLN 100 UNIT/ML 100 UNIT/ML
300 SOLUTION INTRAVENOUS PRN
Status: DISCONTINUED | OUTPATIENT
Start: 2021-05-25 | End: 2021-05-26 | Stop reason: HOSPADM

## 2021-05-25 RX ORDER — DIPHENHYDRAMINE HCL 25 MG
25 TABLET ORAL PRN
Status: CANCELLED
Start: 2021-05-26

## 2021-05-25 RX ORDER — EPINEPHRINE 1 MG/ML
0.3 INJECTION, SOLUTION, CONCENTRATE INTRAVENOUS PRN
Status: CANCELLED
Start: 2021-05-26

## 2021-05-25 RX ORDER — SODIUM CHLORIDE 0.9 % (FLUSH) 0.9 %
5-40 SYRINGE (ML) INJECTION PRN
Status: DISCONTINUED | OUTPATIENT
Start: 2021-05-25 | End: 2021-05-26 | Stop reason: HOSPADM

## 2021-05-25 RX ORDER — SODIUM CHLORIDE 0.9 % (FLUSH) 0.9 %
5-40 SYRINGE (ML) INJECTION PRN
Status: CANCELLED | OUTPATIENT
Start: 2021-05-26

## 2021-05-25 RX ORDER — HEPARIN SODIUM (PORCINE) LOCK FLUSH IV SOLN 100 UNIT/ML 100 UNIT/ML
300 SOLUTION INTRAVENOUS PRN
Status: CANCELLED | OUTPATIENT
Start: 2021-05-26

## 2021-05-25 RX ADMIN — Medication 300 UNITS: at 13:13

## 2021-05-25 RX ADMIN — WATER 2000 MG: 1 INJECTION INTRAMUSCULAR; INTRAVENOUS; SUBCUTANEOUS at 13:07

## 2021-05-25 RX ADMIN — SODIUM CHLORIDE, PRESERVATIVE FREE 10 ML: 5 INJECTION INTRAVENOUS at 13:02

## 2021-05-25 RX ADMIN — SODIUM CHLORIDE, PRESERVATIVE FREE 10 ML: 5 INJECTION INTRAVENOUS at 13:04

## 2021-05-25 RX ADMIN — SODIUM CHLORIDE, PRESERVATIVE FREE 10 ML: 5 INJECTION INTRAVENOUS at 13:13

## 2021-05-25 ASSESSMENT — PAIN DESCRIPTION - ORIENTATION: ORIENTATION: UPPER

## 2021-05-25 ASSESSMENT — PAIN DESCRIPTION - LOCATION: LOCATION: BACK

## 2021-05-25 ASSESSMENT — PAIN DESCRIPTION - DESCRIPTORS: DESCRIPTORS: ACHING

## 2021-05-25 ASSESSMENT — PAIN DESCRIPTION - FREQUENCY: FREQUENCY: CONTINUOUS

## 2021-05-25 NOTE — PROGRESS NOTES
Physical Therapy  Initial Assessment  Date: 2021  Patient Name: Chhaya Hyde  MRN: [id number]  : 1960          Restrictions       Subjective   General  Chart Reviewed: Yes  Patient assessed for rehabilitation services?: Yes  Additional Pertinent Hx: Patient presents to PT to assess right shoulder pain and limited mobility Patient was recently hospitalized secondary to acute pain affecting the lower back While in the hospital patient developed acute right shoulder pain prompting an arthroscopic exam with draining of the [address] joint. Patient was discharged to home 2021  Family / Caregiver Present: Yes  Referring Practitioner: Berto OLIVEIRA  Referral Date : 21  Diagnosis: Right Shoulder Pain  Follows Commands: Within Functional Limits  PT Visit Information  Onset Date: 21  PT Insurance Information: [employer]  Subjective  Subjective: Pain Limited ROM  Pain Screening  Patient Currently in Pain: Yes  Vital Signs  Patient Currently in Pain: Yes    Vision/Hearing  Vision  Vision: Within Functional Limits  Hearing  Hearing: Within functional limits    Orientation  Orientation  Overall Orientation Status: Within Functional Limits    Social/Functional History  Social/Functional History  Lives With: Family  Homemaking Responsibilities: Yes  Active : Yes  Occupation: Full time employment    Objective     Observation/Palpation  Posture: Fair  Palpation: Pain with palpation right proximal [address] region especially the anterior aspect Pain with palpation midline lumbar paraspinal region  Observation: Posture: Head fwd Shoulders fwd with protraction Right Shoulder maintained in adduction with modest right shoulder girdle elevation. Patient self limits movement of the right shoulder. Trunk is flexed fwd with reduced lumbar lordosis Patient uses a wheeled walker to assist ambulation  Edema:  Moderate right anterior shoulder    PROM RUE (degrees)  RUE General PROM: Shoulder mobility limited 70% or more all ranges Pain noted with all ROM  AROM RUE (degrees)  RUE General AROM: Unable to assess secondary to pain  PROM LUE (degrees)  LUE PROM: WFL  AROM LUE (degrees)  LUE AROM : WFL  Spine  Lumbar: Limited all ranges with pain    Strength RUE  Comment: unable to assess due to pain  Strength LUE  Comment: 3+/ to 4-/5  Strength Other  Other: Trunk/core 3+/5  Tone RLE  RLE Tone: Normotonic  Tone LLE  LLE Tone: Normotonic  Motor Control  Gross Motor?: WFL           Transfers  Sit to Stand: Supervision  Stand to sit: Supervision       Ambulation  Ambulation?: Yes  Ambulation 1  Surface: level tile  Device: Rolling Walker  Assistance: Independent  Gait Deviations: Slow Yvette;Decreased step length;Decreased step height  Stairs/Curb  Stairs?: No                            Assessment   Conditions Requiring Skilled Therapeutic Intervention  Body structures, Functions, Activity limitations: Decreased functional mobility ; Increased pain;Decreased posture;Decreased ADL status; Decreased ROM; Decreased strength;Decreased endurance  Prognosis: Fair  Decision Making: Low Complexity  REQUIRES PT FOLLOW UP: Yes         Plan   Plan  Times per week: 3-4  Plan weeks: 1  Current Treatment Recommendations: Strengthening, Home Exercise Program, Manual Therapy - Soft Tissue Mobilization, ROM, Endurance Training, Patient/Caregiver Education & Training, Functional Mobility Training, Modalities    G-Code       OutComes Score                                                  AM-PAC Score             Goals          Therapy Time   Individual Concurrent Group Co-treatment   Time In 0830         Time Out 0930         Minutes 60         Timed Code Treatment Minutes: 39 Minutes       Pallavi Patel PT

## 2021-05-26 ENCOUNTER — HOSPITAL ENCOUNTER (OUTPATIENT)
Dept: PHYSICAL THERAPY | Age: 61
Setting detail: THERAPIES SERIES
Discharge: HOME | End: 2021-05-26
Payer: COMMERCIAL

## 2021-05-26 ENCOUNTER — HOSPITAL ENCOUNTER (OUTPATIENT)
Dept: INFUSION THERAPY | Age: 61
Setting detail: INFUSION SERIES
Discharge: HOME | End: 2021-05-26
Payer: COMMERCIAL

## 2021-05-26 VITALS
BODY MASS INDEX: 25.71 KG/M2 | DIASTOLIC BLOOD PRESSURE: 77 MMHG | SYSTOLIC BLOOD PRESSURE: 122 MMHG | OXYGEN SATURATION: 100 % | HEART RATE: 75 BPM | RESPIRATION RATE: 16 BRPM | TEMPERATURE: 98.5 F | HEIGHT: 66 IN | WEIGHT: 160 LBS

## 2021-05-26 DIAGNOSIS — M00.9 PYOGENIC ARTHRITIS OF RIGHT SHOULDER REGION, DUE TO UNSPECIFIED ORGANISM (HCC): Primary | ICD-10-CM

## 2021-05-26 PROCEDURE — 96374 THER/PROPH/DIAG INJ IV PUSH: CPT

## 2021-05-26 PROCEDURE — 6360000002 HC RX W HCPCS: Performed by: SPECIALIST

## 2021-05-26 PROCEDURE — 2580000003 HC RX 258: Performed by: SPECIALIST

## 2021-05-26 PROCEDURE — 97140 MANUAL THERAPY 1/> REGIONS: CPT

## 2021-05-26 RX ORDER — DIPHENHYDRAMINE HCL 25 MG
50 TABLET ORAL ONCE
Status: CANCELLED
Start: 2021-05-27 | End: 2021-05-27

## 2021-05-26 RX ORDER — SODIUM CHLORIDE 0.9 % (FLUSH) 0.9 %
5-40 SYRINGE (ML) INJECTION PRN
Status: DISCONTINUED | OUTPATIENT
Start: 2021-05-26 | End: 2021-05-27 | Stop reason: HOSPADM

## 2021-05-26 RX ORDER — DIPHENHYDRAMINE HYDROCHLORIDE 50 MG/ML
50 INJECTION INTRAMUSCULAR; INTRAVENOUS ONCE
Status: CANCELLED
Start: 2021-05-27 | End: 2021-05-27

## 2021-05-26 RX ORDER — HEPARIN SODIUM (PORCINE) LOCK FLUSH IV SOLN 100 UNIT/ML 100 UNIT/ML
300 SOLUTION INTRAVENOUS PRN
Status: CANCELLED | OUTPATIENT
Start: 2021-05-27

## 2021-05-26 RX ORDER — SODIUM CHLORIDE 0.9 % (FLUSH) 0.9 %
5-40 SYRINGE (ML) INJECTION PRN
Status: CANCELLED | OUTPATIENT
Start: 2021-05-27

## 2021-05-26 RX ORDER — DIPHENHYDRAMINE HCL 25 MG
25 TABLET ORAL PRN
Status: CANCELLED
Start: 2021-05-27

## 2021-05-26 RX ORDER — EPINEPHRINE 1 MG/ML
0.3 INJECTION, SOLUTION, CONCENTRATE INTRAVENOUS PRN
Status: CANCELLED
Start: 2021-05-27

## 2021-05-26 RX ORDER — HEPARIN SODIUM (PORCINE) LOCK FLUSH IV SOLN 100 UNIT/ML 100 UNIT/ML
300 SOLUTION INTRAVENOUS PRN
Status: DISCONTINUED | OUTPATIENT
Start: 2021-05-26 | End: 2021-05-27 | Stop reason: HOSPADM

## 2021-05-26 RX ADMIN — WATER 2000 MG: 1 INJECTION INTRAMUSCULAR; INTRAVENOUS; SUBCUTANEOUS at 12:04

## 2021-05-26 RX ADMIN — SODIUM CHLORIDE, PRESERVATIVE FREE 10 ML: 5 INJECTION INTRAVENOUS at 12:09

## 2021-05-26 RX ADMIN — Medication 300 UNITS: at 12:10

## 2021-05-26 RX ADMIN — SODIUM CHLORIDE, PRESERVATIVE FREE 10 ML: 5 INJECTION INTRAVENOUS at 12:01

## 2021-05-26 ASSESSMENT — PAIN DESCRIPTION - DESCRIPTORS: DESCRIPTORS: ACHING

## 2021-05-26 ASSESSMENT — PAIN DESCRIPTION - ORIENTATION: ORIENTATION: RIGHT

## 2021-05-26 ASSESSMENT — PAIN DESCRIPTION - LOCATION: LOCATION: SHOULDER

## 2021-05-26 ASSESSMENT — PAIN DESCRIPTION - FREQUENCY: FREQUENCY: CONTINUOUS

## 2021-05-26 ASSESSMENT — PAIN SCALES - GENERAL: PAINLEVEL_OUTOF10: 3

## 2021-05-26 NOTE — PROGRESS NOTES
Hold pending MD visit [] Discharge  Plan for Next Session:         Treatment Charges: Mins Units   Initial Evaluation     Re-Evaluation     Ther Exercise         TE 10 --   Manual Therapy     MT 20 1   Ther Activities        TA     Gait Training          GT     Neuro Re-education NR     Modalities     Non-Billable Service Time 15    Other 5    Total Time/Units 50 1     Electronically signed by:   Jovanny Antonio

## 2021-05-27 ENCOUNTER — HOSPITAL ENCOUNTER (OUTPATIENT)
Dept: INFUSION THERAPY | Age: 61
Setting detail: INFUSION SERIES
Discharge: HOME | End: 2021-05-27
Payer: COMMERCIAL

## 2021-05-27 VITALS
TEMPERATURE: 99.1 F | OXYGEN SATURATION: 100 % | HEIGHT: 66 IN | BODY MASS INDEX: 25.71 KG/M2 | SYSTOLIC BLOOD PRESSURE: 129 MMHG | HEART RATE: 74 BPM | RESPIRATION RATE: 16 BRPM | WEIGHT: 160 LBS | DIASTOLIC BLOOD PRESSURE: 76 MMHG

## 2021-05-27 DIAGNOSIS — M00.9 PYOGENIC ARTHRITIS OF RIGHT SHOULDER REGION, DUE TO UNSPECIFIED ORGANISM (HCC): Primary | ICD-10-CM

## 2021-05-27 LAB
ALBUMIN SERPL-MCNC: 3.3 G/DL (ref 3.5–5.2)
ALP BLD-CCNC: 188 U/L (ref 35–104)
ALT SERPL-CCNC: 173 U/L (ref 0–32)
ANION GAP SERPL CALCULATED.3IONS-SCNC: 11 MMOL/L (ref 7–16)
AST SERPL-CCNC: 66 U/L (ref 0–31)
BASOPHILS ABSOLUTE: 0.01 E9/L (ref 0–0.2)
BASOPHILS RELATIVE PERCENT: 0.1 % (ref 0–2)
BILIRUB SERPL-MCNC: 0.6 MG/DL (ref 0–1.2)
BUN BLDV-MCNC: 10 MG/DL (ref 6–23)
CALCIUM SERPL-MCNC: 9.6 MG/DL (ref 8.6–10.2)
CHLORIDE BLD-SCNC: 100 MMOL/L (ref 98–107)
CO2: 26 MMOL/L (ref 22–29)
CREAT SERPL-MCNC: 0.7 MG/DL (ref 0.5–1)
EOSINOPHILS ABSOLUTE: 0.01 E9/L (ref 0.05–0.5)
EOSINOPHILS RELATIVE PERCENT: 0.1 % (ref 0–6)
GFR AFRICAN AMERICAN: >60
GFR NON-AFRICAN AMERICAN: >60 ML/MIN/1.73
GLUCOSE BLD-MCNC: 92 MG/DL (ref 74–99)
HCT VFR BLD CALC: 28.8 % (ref 34–48)
HEMOGLOBIN: 9.2 G/DL (ref 11.5–15.5)
IMMATURE GRANULOCYTES #: 0.06 E9/L
IMMATURE GRANULOCYTES %: 0.8 % (ref 0–5)
LYMPHOCYTES ABSOLUTE: 1.42 E9/L (ref 1.5–4)
LYMPHOCYTES RELATIVE PERCENT: 18.5 % (ref 20–42)
MCH RBC QN AUTO: 32.2 PG (ref 26–35)
MCHC RBC AUTO-ENTMCNC: 31.9 % (ref 32–34.5)
MCV RBC AUTO: 100.7 FL (ref 80–99.9)
MONOCYTES ABSOLUTE: 0.86 E9/L (ref 0.1–0.95)
MONOCYTES RELATIVE PERCENT: 11.2 % (ref 2–12)
NEUTROPHILS ABSOLUTE: 5.3 E9/L (ref 1.8–7.3)
NEUTROPHILS RELATIVE PERCENT: 69.3 % (ref 43–80)
PDW BLD-RTO: 14.6 FL (ref 11.5–15)
PLATELET # BLD: 434 E9/L (ref 130–450)
PMV BLD AUTO: 10.4 FL (ref 7–12)
POTASSIUM SERPL-SCNC: 3.4 MMOL/L (ref 3.5–5)
RBC # BLD: 2.86 E12/L (ref 3.5–5.5)
SODIUM BLD-SCNC: 137 MMOL/L (ref 132–146)
TOTAL PROTEIN: 8 G/DL (ref 6.4–8.3)
WBC # BLD: 7.7 E9/L (ref 4.5–11.5)

## 2021-05-27 PROCEDURE — 80053 COMPREHEN METABOLIC PANEL: CPT

## 2021-05-27 PROCEDURE — 85025 COMPLETE CBC W/AUTO DIFF WBC: CPT

## 2021-05-27 PROCEDURE — 6360000002 HC RX W HCPCS: Performed by: SPECIALIST

## 2021-05-27 PROCEDURE — 2580000003 HC RX 258: Performed by: SPECIALIST

## 2021-05-27 PROCEDURE — 96374 THER/PROPH/DIAG INJ IV PUSH: CPT

## 2021-05-27 RX ORDER — DIPHENHYDRAMINE HYDROCHLORIDE 50 MG/ML
50 INJECTION INTRAMUSCULAR; INTRAVENOUS ONCE
Status: CANCELLED
Start: 2021-05-28 | End: 2021-05-28

## 2021-05-27 RX ORDER — SODIUM CHLORIDE 0.9 % (FLUSH) 0.9 %
5-40 SYRINGE (ML) INJECTION PRN
Status: DISCONTINUED | OUTPATIENT
Start: 2021-05-27 | End: 2021-05-28 | Stop reason: HOSPADM

## 2021-05-27 RX ORDER — HEPARIN SODIUM (PORCINE) LOCK FLUSH IV SOLN 100 UNIT/ML 100 UNIT/ML
300 SOLUTION INTRAVENOUS PRN
Status: CANCELLED | OUTPATIENT
Start: 2021-05-28

## 2021-05-27 RX ORDER — EPINEPHRINE 1 MG/ML
0.3 INJECTION, SOLUTION, CONCENTRATE INTRAVENOUS PRN
Status: CANCELLED
Start: 2021-05-28

## 2021-05-27 RX ORDER — HEPARIN SODIUM (PORCINE) LOCK FLUSH IV SOLN 100 UNIT/ML 100 UNIT/ML
300 SOLUTION INTRAVENOUS PRN
Status: DISCONTINUED | OUTPATIENT
Start: 2021-05-27 | End: 2021-05-28 | Stop reason: HOSPADM

## 2021-05-27 RX ORDER — SODIUM CHLORIDE 0.9 % (FLUSH) 0.9 %
5-40 SYRINGE (ML) INJECTION PRN
Status: CANCELLED | OUTPATIENT
Start: 2021-05-28

## 2021-05-27 RX ORDER — DIPHENHYDRAMINE HCL 25 MG
25 TABLET ORAL PRN
Status: CANCELLED
Start: 2021-05-28

## 2021-05-27 RX ORDER — DIPHENHYDRAMINE HCL 25 MG
50 TABLET ORAL ONCE
Status: CANCELLED
Start: 2021-05-28 | End: 2021-05-28

## 2021-05-27 RX ADMIN — Medication 300 UNITS: at 13:13

## 2021-05-27 RX ADMIN — WATER 2000 MG: 1 INJECTION INTRAMUSCULAR; INTRAVENOUS; SUBCUTANEOUS at 13:07

## 2021-05-27 RX ADMIN — SODIUM CHLORIDE, PRESERVATIVE FREE 10 ML: 5 INJECTION INTRAVENOUS at 13:01

## 2021-05-27 RX ADMIN — SODIUM CHLORIDE, PRESERVATIVE FREE 10 ML: 5 INJECTION INTRAVENOUS at 13:12

## 2021-05-27 ASSESSMENT — PAIN SCALES - WONG BAKER: WONGBAKER_NUMERICALRESPONSE: 0

## 2021-05-27 ASSESSMENT — PAIN SCALES - GENERAL: PAINLEVEL_OUTOF10: 3

## 2021-05-27 NOTE — FLOWSHEET NOTE
[facility]  Phone: [phone] Fax: [phone]     Physical Therapy  Out Patient Initial Evaluation    Date:  2021    Patient Name:  Charbel Perdue    :  1960  MRN: [id number]    DIAGNOSIS:  Right Shoulder Pain/Back Pain   EVALUATION DATE:  2021  REFERRING PHYSICIAN:  Berto OLIVEIRA  ONSET DATE:  2021    PROBLEMS FOUND DURING EVALUATION  · Pain: Acute pain Right shoulder/scapular region. Pain midline lumbar/LS region Pain rated Constant   · Limited ROM right Shoulder and trunk/hips  · Deficits of strength right shoulder/scapular region and trunk/core     SHORT TERM GOALS  · Patient will report a consistent and sustained reduction in both right shoulder and in lumbar region pain symptoms   · Establish HEP    LONG TERM GOALS  · Right shoulder pain 4/10 or less with ROM and ADL's   · Pain lumbar region 3/10 or less  · AROM Right shoulder WFL's  All ranges  · Strength Right UE 3+ to 4-/5 trunk/core 3+/5 or better  · Patient will be able to maintain and self direct an appropriate follow up independent exercise program     PATIENT GOALS  · Control pain     REHAB POTENTIAL:  Fair    FREQUENCY/DURATION:  2-3 times per week 1-2 weeks ( Note: Patient will be leaving the area to return home to [facility] in one week)     PLAN OF CARE:  Modalities Manual therapy Progressive exercise Postural education HEP      Thank you for the opportunity to work with your patient. If you have questions or comments, please feel free to contact me by phone or fax.       Electronically Signed by Amina Coto, [address] [place] [address]  [id number]        ___________________________________  2021    Physician     Date

## 2021-05-28 ENCOUNTER — HOSPITAL ENCOUNTER (OUTPATIENT)
Dept: INFUSION THERAPY | Age: 61
Setting detail: INFUSION SERIES
Discharge: HOME | End: 2021-05-28
Payer: COMMERCIAL

## 2021-05-28 VITALS
DIASTOLIC BLOOD PRESSURE: 72 MMHG | OXYGEN SATURATION: 98 % | HEART RATE: 89 BPM | TEMPERATURE: 98.3 F | SYSTOLIC BLOOD PRESSURE: 124 MMHG | RESPIRATION RATE: 16 BRPM

## 2021-05-28 DIAGNOSIS — M00.9 PYOGENIC ARTHRITIS OF RIGHT SHOULDER REGION, DUE TO UNSPECIFIED ORGANISM (HCC): Primary | ICD-10-CM

## 2021-05-28 PROCEDURE — 2580000003 HC RX 258: Performed by: SPECIALIST

## 2021-05-28 PROCEDURE — 96365 THER/PROPH/DIAG IV INF INIT: CPT

## 2021-05-28 PROCEDURE — 96374 THER/PROPH/DIAG INJ IV PUSH: CPT

## 2021-05-28 PROCEDURE — 6360000002 HC RX W HCPCS: Performed by: SPECIALIST

## 2021-05-28 RX ORDER — DIPHENHYDRAMINE HYDROCHLORIDE 50 MG/ML
50 INJECTION INTRAMUSCULAR; INTRAVENOUS ONCE
Status: CANCELLED
Start: 2021-05-29 | End: 2021-05-29

## 2021-05-28 RX ORDER — SODIUM CHLORIDE 0.9 % (FLUSH) 0.9 %
5-40 SYRINGE (ML) INJECTION PRN
Status: DISCONTINUED | OUTPATIENT
Start: 2021-05-28 | End: 2021-05-29 | Stop reason: HOSPADM

## 2021-05-28 RX ORDER — DIPHENHYDRAMINE HCL 25 MG
25 TABLET ORAL PRN
Status: CANCELLED
Start: 2021-05-29

## 2021-05-28 RX ORDER — HEPARIN SODIUM (PORCINE) LOCK FLUSH IV SOLN 100 UNIT/ML 100 UNIT/ML
300 SOLUTION INTRAVENOUS PRN
Status: CANCELLED | OUTPATIENT
Start: 2021-05-29

## 2021-05-28 RX ORDER — DIPHENHYDRAMINE HCL 25 MG
50 TABLET ORAL ONCE
Status: CANCELLED
Start: 2021-05-29 | End: 2021-05-29

## 2021-05-28 RX ORDER — EPINEPHRINE 1 MG/ML
0.3 INJECTION, SOLUTION, CONCENTRATE INTRAVENOUS PRN
Status: CANCELLED
Start: 2021-05-29

## 2021-05-28 RX ORDER — SODIUM CHLORIDE 0.9 % (FLUSH) 0.9 %
5-40 SYRINGE (ML) INJECTION PRN
Status: CANCELLED | OUTPATIENT
Start: 2021-05-29

## 2021-05-28 RX ORDER — HEPARIN SODIUM (PORCINE) LOCK FLUSH IV SOLN 100 UNIT/ML 100 UNIT/ML
300 SOLUTION INTRAVENOUS PRN
Status: DISCONTINUED | OUTPATIENT
Start: 2021-05-28 | End: 2021-05-29 | Stop reason: HOSPADM

## 2021-05-28 RX ADMIN — SODIUM CHLORIDE, PRESERVATIVE FREE 10 ML: 5 INJECTION INTRAVENOUS at 13:31

## 2021-05-28 RX ADMIN — SODIUM CHLORIDE, PRESERVATIVE FREE 10 ML: 5 INJECTION INTRAVENOUS at 13:22

## 2021-05-28 RX ADMIN — WATER 2000 MG: 1 INJECTION INTRAMUSCULAR; INTRAVENOUS; SUBCUTANEOUS at 13:25

## 2021-05-28 RX ADMIN — Medication 300 UNITS: at 13:32

## 2021-05-28 ASSESSMENT — PAIN DESCRIPTION - ORIENTATION: ORIENTATION: RIGHT

## 2021-05-28 ASSESSMENT — PAIN DESCRIPTION - LOCATION: LOCATION: SHOULDER

## 2021-05-28 ASSESSMENT — PAIN DESCRIPTION - FREQUENCY: FREQUENCY: CONTINUOUS

## 2021-05-28 ASSESSMENT — PAIN SCALES - GENERAL: PAINLEVEL_OUTOF10: 4

## 2021-05-28 ASSESSMENT — PAIN DESCRIPTION - DESCRIPTORS: DESCRIPTORS: ACHING

## 2021-05-29 ENCOUNTER — HOSPITAL ENCOUNTER (OUTPATIENT)
Dept: INFUSION THERAPY | Age: 61
Setting detail: INFUSION SERIES
Discharge: HOME | End: 2021-05-29
Payer: COMMERCIAL

## 2021-05-29 ENCOUNTER — HOSPITAL ENCOUNTER (OUTPATIENT)
Dept: PHYSICAL THERAPY | Age: 61
Setting detail: THERAPIES SERIES
Discharge: HOME | End: 2021-05-29
Payer: COMMERCIAL

## 2021-05-29 VITALS
HEART RATE: 75 BPM | WEIGHT: 160 LBS | RESPIRATION RATE: 16 BRPM | BODY MASS INDEX: 25.82 KG/M2 | OXYGEN SATURATION: 97 % | DIASTOLIC BLOOD PRESSURE: 71 MMHG | SYSTOLIC BLOOD PRESSURE: 140 MMHG | TEMPERATURE: 98.8 F

## 2021-05-29 DIAGNOSIS — M00.9 PYOGENIC ARTHRITIS OF RIGHT SHOULDER REGION, DUE TO UNSPECIFIED ORGANISM (HCC): Primary | ICD-10-CM

## 2021-05-29 PROCEDURE — 96374 THER/PROPH/DIAG INJ IV PUSH: CPT

## 2021-05-29 PROCEDURE — 97110 THERAPEUTIC EXERCISES: CPT

## 2021-05-29 PROCEDURE — 2580000003 HC RX 258: Performed by: SPECIALIST

## 2021-05-29 PROCEDURE — G0283 ELEC STIM OTHER THAN WOUND: HCPCS

## 2021-05-29 PROCEDURE — 6360000002 HC RX W HCPCS: Performed by: SPECIALIST

## 2021-05-29 RX ORDER — DIPHENHYDRAMINE HYDROCHLORIDE 50 MG/ML
50 INJECTION INTRAMUSCULAR; INTRAVENOUS ONCE
Status: CANCELLED
Start: 2021-05-30 | End: 2021-05-30

## 2021-05-29 RX ORDER — SODIUM CHLORIDE 0.9 % (FLUSH) 0.9 %
5-40 SYRINGE (ML) INJECTION PRN
Status: DISCONTINUED | OUTPATIENT
Start: 2021-05-29 | End: 2021-05-30 | Stop reason: HOSPADM

## 2021-05-29 RX ORDER — HEPARIN SODIUM (PORCINE) LOCK FLUSH IV SOLN 100 UNIT/ML 100 UNIT/ML
300 SOLUTION INTRAVENOUS PRN
Status: CANCELLED | OUTPATIENT
Start: 2021-05-30

## 2021-05-29 RX ORDER — DIPHENHYDRAMINE HCL 25 MG
25 TABLET ORAL PRN
Status: CANCELLED
Start: 2021-05-30

## 2021-05-29 RX ORDER — HEPARIN SODIUM (PORCINE) LOCK FLUSH IV SOLN 100 UNIT/ML 100 UNIT/ML
300 SOLUTION INTRAVENOUS PRN
Status: DISCONTINUED | OUTPATIENT
Start: 2021-05-29 | End: 2021-05-30 | Stop reason: HOSPADM

## 2021-05-29 RX ORDER — SODIUM CHLORIDE 0.9 % (FLUSH) 0.9 %
5-40 SYRINGE (ML) INJECTION PRN
Status: CANCELLED | OUTPATIENT
Start: 2021-05-30

## 2021-05-29 RX ORDER — EPINEPHRINE 1 MG/ML
0.3 INJECTION, SOLUTION, CONCENTRATE INTRAVENOUS PRN
Status: CANCELLED
Start: 2021-05-30

## 2021-05-29 RX ORDER — DIPHENHYDRAMINE HCL 25 MG
50 TABLET ORAL ONCE
Status: CANCELLED
Start: 2021-05-30 | End: 2021-05-30

## 2021-05-29 RX ADMIN — WATER 2000 MG: 1 INJECTION INTRAMUSCULAR; INTRAVENOUS; SUBCUTANEOUS at 07:40

## 2021-05-29 RX ADMIN — Medication 300 UNITS: at 07:46

## 2021-05-29 RX ADMIN — SODIUM CHLORIDE, PRESERVATIVE FREE 10 ML: 5 INJECTION INTRAVENOUS at 07:46

## 2021-05-29 RX ADMIN — SODIUM CHLORIDE, PRESERVATIVE FREE 10 ML: 5 INJECTION INTRAVENOUS at 07:38

## 2021-05-29 ASSESSMENT — PAIN SCALES - GENERAL: PAINLEVEL_OUTOF10: 0

## 2021-05-29 NOTE — PROGRESS NOTES
[facility]  Phone: [phone] Fax: [phone]       Physical Therapy Daily Treatment Note  Date:  2021    Patient Name:  Kvng Garcia    :  1960  MRN: [id number]    Restrictions/Precautions:    Diagnosis: Right Shoulder Pain   Treatment Diagnosis:    Insurance/Certification information:  Felisha Johnson  Referring Practitioner: Berto   Plan of care signed (Y/N):    Visit# / total visits: 3/1 week  Pain level: /10  Time In: 08:50       Time Out:  930       Subjective:  Pt reports pain intensity overall is somewhat better but patient is concerned that she is making only slow progress with both pain control and mobility int he right shoulder and back     Exercises:  Exercise/Equipment Resistance/Repetitions Other comments                                                                                                                                             Other Therapeutic Activities: Instructed pt in supine to sit \"log roll\" type technique Patient's  was present to observe     Home Exercise Program:    Manual Treatments: PROM to right shoulder flex/abd/ER/IR x 2 bouts. Cued for decreased guarding. Modalities: MHP to right shoulder (prior to ROM) and lumbar region x 15 min                       Estim Lumbar region with heat 20 min      Timed Code Treatment Minutes:  40    Total Treatment Minutes:  50    Treatment/Activity Tolerance:  [x] Patient tolerated treatment well [] Patient limited by fatigue  [] Patient limited by pain  [] Patient limited by other medical complications  [] Other:     Plan:   [] Continue per plan of care [] Alter current plan (see comments)  [] Plan of care initiated [] Hold pending MD visit [x] Discharge  Plan for Next Session:   Patient is returning to [facility], her place of residence, on 21.  Patient will be making arrangements to continue her outpatient PT in [facility]        Treatment Charges: Mins Units   Initial Evaluation

## 2021-05-30 ENCOUNTER — HOSPITAL ENCOUNTER (OUTPATIENT)
Dept: INFUSION THERAPY | Age: 61
Setting detail: INFUSION SERIES
Discharge: HOME | End: 2021-05-30
Payer: COMMERCIAL

## 2021-05-30 VITALS
SYSTOLIC BLOOD PRESSURE: 139 MMHG | TEMPERATURE: 98 F | HEART RATE: 73 BPM | DIASTOLIC BLOOD PRESSURE: 72 MMHG | OXYGEN SATURATION: 99 %

## 2021-05-30 DIAGNOSIS — M00.9 PYOGENIC ARTHRITIS OF RIGHT SHOULDER REGION, DUE TO UNSPECIFIED ORGANISM (HCC): Primary | ICD-10-CM

## 2021-05-30 PROCEDURE — 6360000002 HC RX W HCPCS: Performed by: SPECIALIST

## 2021-05-30 PROCEDURE — 2580000003 HC RX 258: Performed by: SPECIALIST

## 2021-05-30 PROCEDURE — 96374 THER/PROPH/DIAG INJ IV PUSH: CPT

## 2021-05-30 PROCEDURE — 96365 THER/PROPH/DIAG IV INF INIT: CPT

## 2021-05-30 RX ORDER — EPINEPHRINE 1 MG/ML
0.3 INJECTION, SOLUTION, CONCENTRATE INTRAVENOUS PRN
Status: CANCELLED
Start: 2021-05-31

## 2021-05-30 RX ORDER — DIPHENHYDRAMINE HCL 25 MG
25 TABLET ORAL PRN
Status: CANCELLED
Start: 2021-05-31

## 2021-05-30 RX ORDER — DIPHENHYDRAMINE HCL 25 MG
50 TABLET ORAL ONCE
Status: CANCELLED
Start: 2021-05-31 | End: 2021-05-31

## 2021-05-30 RX ORDER — SODIUM CHLORIDE 0.9 % (FLUSH) 0.9 %
5-40 SYRINGE (ML) INJECTION PRN
Status: DISCONTINUED | OUTPATIENT
Start: 2021-05-30 | End: 2021-05-31 | Stop reason: HOSPADM

## 2021-05-30 RX ORDER — HEPARIN SODIUM (PORCINE) LOCK FLUSH IV SOLN 100 UNIT/ML 100 UNIT/ML
300 SOLUTION INTRAVENOUS PRN
Status: CANCELLED | OUTPATIENT
Start: 2021-05-31

## 2021-05-30 RX ORDER — SODIUM CHLORIDE 0.9 % (FLUSH) 0.9 %
5-40 SYRINGE (ML) INJECTION PRN
Status: CANCELLED | OUTPATIENT
Start: 2021-05-31

## 2021-05-30 RX ORDER — DIPHENHYDRAMINE HYDROCHLORIDE 50 MG/ML
50 INJECTION INTRAMUSCULAR; INTRAVENOUS ONCE
Status: CANCELLED
Start: 2021-05-31 | End: 2021-05-31

## 2021-05-30 RX ORDER — HEPARIN SODIUM (PORCINE) LOCK FLUSH IV SOLN 100 UNIT/ML 100 UNIT/ML
300 SOLUTION INTRAVENOUS PRN
Status: DISCONTINUED | OUTPATIENT
Start: 2021-05-30 | End: 2021-05-31 | Stop reason: HOSPADM

## 2021-05-30 RX ADMIN — SODIUM CHLORIDE, PRESERVATIVE FREE 10 ML: 5 INJECTION INTRAVENOUS at 07:46

## 2021-05-30 RX ADMIN — SODIUM CHLORIDE, PRESERVATIVE FREE 10 ML: 5 INJECTION INTRAVENOUS at 07:47

## 2021-05-30 RX ADMIN — WATER 2000 MG: 1 INJECTION INTRAMUSCULAR; INTRAVENOUS; SUBCUTANEOUS at 07:39

## 2021-05-30 RX ADMIN — Medication 300 UNITS: at 07:48

## 2021-05-30 ASSESSMENT — PAIN SCALES - GENERAL: PAINLEVEL_OUTOF10: 3

## 2021-05-30 ASSESSMENT — PAIN DESCRIPTION - ORIENTATION: ORIENTATION: RIGHT

## 2021-05-30 ASSESSMENT — PAIN DESCRIPTION - DESCRIPTORS: DESCRIPTORS: ACHING

## 2021-05-30 ASSESSMENT — PAIN DESCRIPTION - LOCATION: LOCATION: SHOULDER

## 2021-05-30 ASSESSMENT — PAIN DESCRIPTION - FREQUENCY: FREQUENCY: CONTINUOUS

## 2021-05-30 NOTE — PROGRESS NOTES
Patient  Discharged  with picc in place  Covered securely  And flushed post recieivng  Medication  As daily ordered  And will be traveling  Back home to  Resume daily  [place]  At infusion facility  Near her home     End cap and clamp on   Tolerated infusion well. Therapy plan reviewed with patient. Verbalizes understanding. Reviewed AVS with patient, reviewed medication information, verbalizes good knowledge of current plan, and has no signs or symptoms to report at this time. Declines copy of AVS. Patient instructed on s/s infection/complication with midline to report and instructed on keeping midline dressing clean, dry and intact patient verbalizes understanding. Next appointment scheduled.

## 2021-06-04 ENCOUNTER — OFFICE VISIT (OUTPATIENT)
Dept: URBAN - METROPOLITAN AREA SURGERY CENTER 19 | Facility: SURGERY CENTER | Age: 61
End: 2021-06-04

## 2021-06-05 LAB
BODY FLUID CULTURE, STERILE: NORMAL
GRAM STAIN RESULT: NORMAL

## 2021-06-28 LAB
FUNGUS (MYCOLOGY) CULTURE: NORMAL
FUNGUS STAIN: NORMAL

## 2021-07-08 LAB
HCT VFR BLD CALC: 33.7 % (ref 34–48)
HEMOGLOBIN: 11.8 G/DL (ref 11.5–15.5)
MCH RBC QN AUTO: 34.4 PG (ref 26–35)
MCHC RBC AUTO-ENTMCNC: 35 % (ref 32–34.5)
MCV RBC AUTO: 98.3 FL (ref 80–99.9)
PDW BLD-RTO: 14.4 FL (ref 11.5–15)
PLATELET # BLD: 166 E9/L (ref 130–450)
PMV BLD AUTO: 10 FL (ref 7–12)
RBC # BLD: 3.43 E12/L (ref 3.5–5.5)
WBC # BLD: 3.6 E9/L (ref 4.5–11.5)

## 2021-07-13 LAB
AFB CULTURE (MYCOBACTERIA): NORMAL
AFB SMEAR: NORMAL

## 2023-01-24 ENCOUNTER — TELEPHONE ENCOUNTER (OUTPATIENT)
Dept: URBAN - METROPOLITAN AREA CLINIC 74 | Facility: CLINIC | Age: 63
End: 2023-01-24

## 2023-01-25 ENCOUNTER — WEB ENCOUNTER (OUTPATIENT)
Dept: URBAN - METROPOLITAN AREA CLINIC 74 | Facility: CLINIC | Age: 63
End: 2023-01-25

## 2023-01-25 ENCOUNTER — OFFICE VISIT (OUTPATIENT)
Dept: URBAN - METROPOLITAN AREA CLINIC 74 | Facility: CLINIC | Age: 63
End: 2023-01-25
Payer: COMMERCIAL

## 2023-01-25 ENCOUNTER — LAB OUTSIDE AN ENCOUNTER (OUTPATIENT)
Dept: URBAN - METROPOLITAN AREA CLINIC 74 | Facility: CLINIC | Age: 63
End: 2023-01-25

## 2023-01-25 ENCOUNTER — DASHBOARD ENCOUNTERS (OUTPATIENT)
Age: 63
End: 2023-01-25

## 2023-01-25 DIAGNOSIS — R19.7 DIARRHEA: ICD-10-CM

## 2023-01-25 DIAGNOSIS — Z12.11 COLON CANCER SCREENING: ICD-10-CM

## 2023-01-25 DIAGNOSIS — R19.5 GREEN STOOL: ICD-10-CM

## 2023-01-25 DIAGNOSIS — R19.8 CHANGE IN BOWEL FUNCTION: ICD-10-CM

## 2023-01-25 DIAGNOSIS — R10.30 ABDOMINAL PAIN, LOWER: ICD-10-CM

## 2023-01-25 PROCEDURE — 99203 OFFICE O/P NEW LOW 30 MIN: CPT | Performed by: INTERNAL MEDICINE

## 2023-01-25 RX ORDER — OMEPRAZOLE 20 MG/1
1 CAPSULE 30 MINUTES BEFORE MORNING MEAL CAPSULE, DELAYED RELEASE ORAL ONCE A DAY
Status: ACTIVE | COMMUNITY

## 2023-01-25 RX ORDER — CIPROFLOXACIN 500 MG/1
1 TABLET TABLET, FILM COATED ORAL
Qty: 14 TABLET | Refills: 0 | OUTPATIENT
Start: 2023-01-25 | End: 2023-02-01

## 2023-01-25 RX ORDER — DICYCLOMINE HYDROCHLORIDE 20 MG/1
1 TABLET TABLET ORAL THREE TIMES A DAY
Qty: 270 TABLET | Refills: 3 | OUTPATIENT
Start: 2023-01-25 | End: 2024-01-20

## 2023-01-25 NOTE — HPI-TODAY'S VISIT:
Pt referred for evaluation of abdominal pain.  Lower cramps, dull and spasms occur often. Change in bowels, looser stools, no a/a factors, no bleeding.  Fecal hurry. Bloating after eating. No n/v and no red flag s/s. Onset after she ate raw oysters a few weeks ago. Colonoscopy 2010, HPP polyps, due for screening.

## 2023-01-26 ENCOUNTER — LAB OUTSIDE AN ENCOUNTER (OUTPATIENT)
Dept: URBAN - METROPOLITAN AREA CLINIC 40 | Facility: CLINIC | Age: 63
End: 2023-01-26

## 2023-01-27 PROBLEM — 62315008 DIARRHEA: Status: ACTIVE | Noted: 2023-01-27

## 2023-01-30 ENCOUNTER — TELEPHONE ENCOUNTER (OUTPATIENT)
Dept: URBAN - METROPOLITAN AREA CLINIC 74 | Facility: CLINIC | Age: 63
End: 2023-01-30

## 2023-01-30 LAB
C. DIFFICILE TOXIN A/B, STOOL - QDX: POSITIVE
GASTROINTESTINAL PATHOGEN: (no result)

## 2023-01-30 RX ORDER — METRONIDAZOLE 500 MG/1
1 TABLET TABLET ORAL THREE TIMES A DAY
Qty: 21 TABLET | Refills: 0 | OUTPATIENT
Start: 2023-01-30 | End: 2023-02-06

## 2023-01-30 RX ORDER — FLUCONAZOLE 200 MG/1
1 TABLET TABLET ORAL QD
Qty: 3 | Refills: 0 | OUTPATIENT
Start: 2023-01-30 | End: 2023-02-02

## 2023-01-30 RX ORDER — CIPROFLOXACIN 500 MG/1
1 TABLET TABLET, FILM COATED ORAL
Qty: 14 TABLET | Refills: 0 | Status: ACTIVE | COMMUNITY
Start: 2023-01-25 | End: 2023-02-01

## 2023-01-30 RX ORDER — OMEPRAZOLE 20 MG/1
1 CAPSULE 30 MINUTES BEFORE MORNING MEAL CAPSULE, DELAYED RELEASE ORAL ONCE A DAY
Status: ACTIVE | COMMUNITY

## 2023-01-30 RX ORDER — DICYCLOMINE HYDROCHLORIDE 20 MG/1
1 TABLET TABLET ORAL THREE TIMES A DAY
Qty: 270 TABLET | Refills: 3 | Status: ACTIVE | COMMUNITY
Start: 2023-01-25 | End: 2024-01-20

## 2023-01-30 RX ORDER — FLUCONAZOLE 200 MG/1
1 TABLET TABLET ORAL QD
Qty: 3 | Refills: 0 | Status: ACTIVE | COMMUNITY
Start: 2023-01-30 | End: 2023-02-02

## 2023-01-30 NOTE — HPI-TODAY'S VISIT:
Pt feels 100% better after Cipro 6 days. Normal bm and no pain. GPP positive for C.diff. Plan: Stop Cipro Take Flagyl 7 days (she is traveling to see elderly parents, so Rx sent to prevent recurrance)

## 2023-02-22 ENCOUNTER — OFFICE VISIT (OUTPATIENT)
Dept: URBAN - METROPOLITAN AREA CLINIC 74 | Facility: CLINIC | Age: 63
End: 2023-02-22

## 2023-02-28 ENCOUNTER — TELEPHONE ENCOUNTER (OUTPATIENT)
Dept: URBAN - METROPOLITAN AREA CLINIC 74 | Facility: CLINIC | Age: 63
End: 2023-02-28

## 2023-02-28 RX ORDER — VANCOMYCIN HYDROCHLORIDE 250 MG/1
1 CAPSULE CAPSULE ORAL TID
Qty: 63 CAPSULE | Refills: 1 | OUTPATIENT
Start: 2023-03-01 | End: 2023-04-12

## 2023-03-01 ENCOUNTER — TELEPHONE ENCOUNTER (OUTPATIENT)
Dept: URBAN - METROPOLITAN AREA CLINIC 74 | Facility: CLINIC | Age: 63
End: 2023-03-01

## 2023-03-02 ENCOUNTER — OFFICE VISIT (OUTPATIENT)
Dept: URBAN - METROPOLITAN AREA SURGERY CENTER 30 | Facility: SURGERY CENTER | Age: 63
End: 2023-03-02

## 2023-04-19 ENCOUNTER — OFFICE VISIT (OUTPATIENT)
Dept: URBAN - METROPOLITAN AREA CLINIC 74 | Facility: CLINIC | Age: 63
End: 2023-04-19

## 2023-05-05 ENCOUNTER — OFFICE VISIT (OUTPATIENT)
Dept: URBAN - METROPOLITAN AREA SURGERY CENTER 30 | Facility: SURGERY CENTER | Age: 63
End: 2023-05-05
Payer: COMMERCIAL

## 2023-05-05 DIAGNOSIS — Z12.11 COLON CANCER SCREENING: ICD-10-CM

## 2023-05-05 PROCEDURE — 45378 DIAGNOSTIC COLONOSCOPY: CPT | Performed by: INTERNAL MEDICINE

## 2023-05-05 PROCEDURE — G8907 PT DOC NO EVENTS ON DISCHARG: HCPCS | Performed by: INTERNAL MEDICINE

## 2023-05-08 ENCOUNTER — WEB ENCOUNTER (OUTPATIENT)
Dept: URBAN - METROPOLITAN AREA CLINIC 74 | Facility: CLINIC | Age: 63
End: 2023-05-08

## 2023-05-24 ENCOUNTER — OFFICE VISIT (OUTPATIENT)
Dept: URBAN - METROPOLITAN AREA CLINIC 74 | Facility: CLINIC | Age: 63
End: 2023-05-24

## 2024-01-09 ENCOUNTER — RX ONLY (OUTPATIENT)
Age: 64
Setting detail: RX ONLY
End: 2024-01-09

## 2024-01-09 RX ORDER — NYSTATIN AND TRIAMCINOLONE ACETONIDE 100000; 1 [USP'U]/G; MG/G
CREAM TOPICAL
Qty: 30 | Refills: 0 | Status: ERX | COMMUNITY
Start: 2024-01-09

## (undated) DEVICE — GOWN,SIRUS,POLYRNF,SETINSLV,XL,20/CS: Brand: MEDLINE

## (undated) DEVICE — SYRINGE IRRIG 60ML SFT PLIABLE BLB EZ TO GRP 1 HND USE W/

## (undated) DEVICE — TRAY SPIDER ARM POSITIONER REUSABLE

## (undated) DEVICE — PACK,SHOULDER SPLIT: Brand: MEDLINE

## (undated) DEVICE — INSTRUMENT CURRETTES REUSABLE

## (undated) DEVICE — DOUBLE BASIN SET: Brand: MEDLINE INDUSTRIES, INC.

## (undated) DEVICE — SYRINGE 20ML LL S/C 50

## (undated) DEVICE — SHOULDER STABILIZATION KIT,                                    DISPOSABLE 12 PER BOX

## (undated) DEVICE — 3M™ COBAN™ NL STERILE NON-LATEX SELF-ADHERENT WRAP, 2084S, 4 IN X 5 YD (10 CM X 4,5 M), 18 ROLLS/CASE: Brand: 3M™ COBAN™

## (undated) DEVICE — TRAY SYSTEM 7 DRILL REUSABLE

## (undated) DEVICE — TRAP SPEC MUCUS FOR SUCT

## (undated) DEVICE — SYSTEM TPS ORTHO

## (undated) DEVICE — WAND REPROC COBLATION SUPER TURBOVAC 90 90DEG SUCT ARTHROWAND 3.75MM

## (undated) DEVICE — BLADE ES L6IN ELASTOMERIC COAT EXT DURABLE BEND UPTO 90DEG

## (undated) DEVICE — Device

## (undated) DEVICE — SOLUTION IV IRRIG POUR BRL 0.9% SODIUM CHL 2F7124

## (undated) DEVICE — INTENT TO BE USED WITH SUTURE MATERIAL FOR TISSUE CLOSURE: Brand: RICHARD-ALLAN® NEEDLE 1/2 CIRCLE TAPER

## (undated) DEVICE — PACK PROCEDURE SURG GEN CUST

## (undated) DEVICE — SET ORTHO STD STORTSTD1

## (undated) DEVICE — GAUZE,SPONGE,4"X4",8PLY,STRL,LF,10/TRAY: Brand: MEDLINE

## (undated) DEVICE — ALCOHOL RUBBING ISO 16OZ 70%

## (undated) DEVICE — SWITCH DRAPE TENET 7633

## (undated) DEVICE — HEWSON SUTURE RETRIEVER: Brand: HEWSON SUTURE RETRIEVER

## (undated) DEVICE — ELECTRODE PT RET AD L9FT HI MOIST COND ADH HYDRGEL CORDED

## (undated) DEVICE — 4-PORT MANIFOLD: Brand: NEPTUNE 2

## (undated) DEVICE — DRAPE,REIN 53X77,STERILE: Brand: MEDLINE

## (undated) DEVICE — INSTRUMENT SYSTEM 7 BATTERY REUSABLE

## (undated) DEVICE — TRAY LAMBOTS REUSABLE

## (undated) DEVICE — GLOVE SURG SZ 8 CRM LTX FREE POLYISOPRENE POLYMER BEAD ANTI

## (undated) DEVICE — TUBING, SUCTION, 9/32" X 10', STRAIGHT: Brand: MEDLINE

## (undated) DEVICE — SOLUTION IV IRRIG LACTATED RINGERS 3000ML 2B7487

## (undated) DEVICE — TUBING, SUCTION, 3/16" X 12', STRAIGHT: Brand: MEDLINE

## (undated) DEVICE — 3M™ IOBAN™ 2 ANTIMICROBIAL INCISE DRAPE 6650EZ: Brand: IOBAN™ 2

## (undated) DEVICE — PAD,ABDOMINAL,5"X9",ST,LF,25/BX: Brand: MEDLINE INDUSTRIES, INC.

## (undated) DEVICE — CONVERTORS STOCKINETTE: Brand: CONVERTORS

## (undated) DEVICE — COVER,BOOT,FOAM,NON-SKID,HOOK-LOOP,XLG: Brand: MEDLINE INDUSTRIES, INC.

## (undated) DEVICE — 3M™ STERI-DRAPE™ U-DRAPE 1015: Brand: STERI-DRAPE™

## (undated) DEVICE — TOWEL,OR,DSP,ST,BLUE,STD,6/PK,12PK/CS: Brand: MEDLINE

## (undated) DEVICE — TUBING SUCT 12FR MAL ALUM SHFT FN CAP VENT UNIV CONN W/ OBT

## (undated) DEVICE — 2108 SERIES SAGITTAL BLADE, OFFSET (20.0 X 0.89 X 80.0MM)

## (undated) DEVICE — BIT DRL L5IN DIA2MM STD ST S STL TWST BUSA

## (undated) DEVICE — INTENDED FOR TISSUE SEPARATION, AND OTHER PROCEDURES THAT REQUIRE A SHARP SURGICAL BLADE TO PUNCTURE OR CUT.: Brand: BARD-PARKER ® STAINLESS STEEL BLADES

## (undated) DEVICE — IMMOBILIZER SHLDR L10.5-17IN D7IN SLNG W/ 15DEG ABD PLLW

## (undated) DEVICE — BLADE CLIPPER GEN PURP NS

## (undated) DEVICE — T-MAX DISPOSABLE FACE MASK 8 PER BOX

## (undated) DEVICE — PACKING,VAGINAL,XR,ST,4"X36",100EA: Brand: MEDLINE

## (undated) DEVICE — SPONGE LAP W18XL18IN WHT COT 4 PLY FLD STRUNG RADPQ DISP ST

## (undated) DEVICE — CHLORAPREP 26ML ORANGE

## (undated) DEVICE — 1000 S-DRAPE TOWEL DRAPE 10/BX: Brand: STERI-DRAPE™

## (undated) DEVICE — ADHESIVE SKIN CLSR 0.7ML TOP DERMBND ADV

## (undated) DEVICE — DRAPE,U/ SHT,SPLIT,PLAS,STERIL: Brand: MEDLINE